# Patient Record
Sex: FEMALE | Employment: PART TIME | URBAN - METROPOLITAN AREA
[De-identification: names, ages, dates, MRNs, and addresses within clinical notes are randomized per-mention and may not be internally consistent; named-entity substitution may affect disease eponyms.]

---

## 2017-04-20 ENCOUNTER — TELEPHONE (OUTPATIENT)
Dept: FAMILY MEDICINE | Facility: CLINIC | Age: 32
End: 2017-04-20

## 2017-04-20 NOTE — TELEPHONE ENCOUNTER
Panel Management Review      Patient has the following on her problem list: None      Composite cancer screening  Chart review shows that this patient is due/due soon for the following Pap Smear  Summary:    Patient is due/failing the following:   PAP    Action needed:   Patient needs office visit for Physical with pap.    Type of outreach:    Phone, spoke to patient.  she scheduled physical with pap for 04/25 ta 930 am    Questions for provider review:    None                                                                                                                                    Stephanie Broussard CMA       Chart closed .

## 2017-04-25 ENCOUNTER — OFFICE VISIT (OUTPATIENT)
Dept: FAMILY MEDICINE | Facility: CLINIC | Age: 32
End: 2017-04-25
Payer: COMMERCIAL

## 2017-04-25 VITALS
BODY MASS INDEX: 35.44 KG/M2 | RESPIRATION RATE: 20 BRPM | HEIGHT: 63 IN | WEIGHT: 200 LBS | TEMPERATURE: 98.6 F | SYSTOLIC BLOOD PRESSURE: 105 MMHG | DIASTOLIC BLOOD PRESSURE: 69 MMHG | HEART RATE: 60 BPM

## 2017-04-25 DIAGNOSIS — Z00.00 ROUTINE GENERAL MEDICAL EXAMINATION AT A HEALTH CARE FACILITY: Primary | ICD-10-CM

## 2017-04-25 DIAGNOSIS — Z83.3 FAMILY HISTORY OF DIABETES MELLITUS: ICD-10-CM

## 2017-04-25 DIAGNOSIS — T83.32XA IUD STRINGS LOST: ICD-10-CM

## 2017-04-25 LAB
BASOPHILS # BLD AUTO: 0 10E9/L (ref 0–0.2)
BASOPHILS NFR BLD AUTO: 0.2 %
DIFFERENTIAL METHOD BLD: NORMAL
EOSINOPHIL # BLD AUTO: 0.1 10E9/L (ref 0–0.7)
EOSINOPHIL NFR BLD AUTO: 2.3 %
ERYTHROCYTE [DISTWIDTH] IN BLOOD BY AUTOMATED COUNT: 13 % (ref 10–15)
HBA1C MFR BLD: 5.4 % (ref 4.3–6)
HCT VFR BLD AUTO: 38.7 % (ref 35–47)
HGB BLD-MCNC: 13.2 G/DL (ref 11.7–15.7)
LYMPHOCYTES # BLD AUTO: 2.6 10E9/L (ref 0.8–5.3)
LYMPHOCYTES NFR BLD AUTO: 45.6 %
MCH RBC QN AUTO: 29.1 PG (ref 26.5–33)
MCHC RBC AUTO-ENTMCNC: 34.1 G/DL (ref 31.5–36.5)
MCV RBC AUTO: 85 FL (ref 78–100)
MONOCYTES # BLD AUTO: 0.4 10E9/L (ref 0–1.3)
MONOCYTES NFR BLD AUTO: 6.7 %
NEUTROPHILS # BLD AUTO: 2.6 10E9/L (ref 1.6–8.3)
NEUTROPHILS NFR BLD AUTO: 45.2 %
PLATELET # BLD AUTO: 284 10E9/L (ref 150–450)
RBC # BLD AUTO: 4.54 10E12/L (ref 3.8–5.2)
WBC # BLD AUTO: 5.7 10E9/L (ref 4–11)

## 2017-04-25 PROCEDURE — 36415 COLL VENOUS BLD VENIPUNCTURE: CPT | Performed by: FAMILY MEDICINE

## 2017-04-25 PROCEDURE — G0145 SCR C/V CYTO,THINLAYER,RESCR: HCPCS | Performed by: FAMILY MEDICINE

## 2017-04-25 PROCEDURE — 87624 HPV HI-RISK TYP POOLED RSLT: CPT | Performed by: FAMILY MEDICINE

## 2017-04-25 PROCEDURE — 83036 HEMOGLOBIN GLYCOSYLATED A1C: CPT | Performed by: FAMILY MEDICINE

## 2017-04-25 PROCEDURE — 99395 PREV VISIT EST AGE 18-39: CPT | Performed by: FAMILY MEDICINE

## 2017-04-25 PROCEDURE — 85025 COMPLETE CBC W/AUTO DIFF WBC: CPT | Performed by: FAMILY MEDICINE

## 2017-04-25 NOTE — LETTER
May 1, 2017    Aubree Laura  [address] [facility] [address]    Dear Aubree,  We are happy to inform you that your PAP smear result from 04/25/17 is normal.  We are now able to do a follow up test on PAP smears. The DNA test is for HPV (Human Papilloma Virus). Cervical cancer is closely linked with certain types of HPV. Your result showed no evidence of high risk HPV.  Therefore we recommend you return in 3 years for your next pap smear.  You will still need to return to the clinic every year for an annual exam and other preventive tests.  Please contact the clinic at [phone] with any questions.  Sincerely,    Jenn Lay MD/[facility]

## 2017-04-25 NOTE — PROGRESS NOTES
SUBJECTIVE:     CC: Aubree Laura is an 31 year old woman who presents for preventive health visit.     Healthy Habits:    Do you get at least three servings of calcium containing foods daily (dairy, green leafy vegetables, etc.)? yes    Amount of exercise or daily activities, outside of work: 0 day(s) per week    Problems taking medications regularly not applicable    Medication side effects: No    Have you had an eye exam in the past two years? yes    Do you see a dentist twice per year? yes    Do you have sleep apnea, excessive snoring or daytime drowsiness?no    Other:    IUD placed 3/7/14 - no period with IUD  Also reports some weight gain. Does not really drink much soda or juice. History of gestational diabetes, both parents also have diabetes.         Today's PHQ-2 Score:   PHQ-2 ( 1999 Pf[employer]) 12/27/2016 5/2/2016   Q1: Little interest or pleasure in doing things 0 0   Q2: Feeling down, depressed or hopeless 0 0   PHQ-2 Score 0 0       Abuse: Current or Past(Physical, Sexual or Emotional)- No  Do you feel safe in your environment - Yes    Social History   Substance Use Topics     Smoking status: Never Smoker     Smokeless tobacco: Never Used     Alcohol use No     The patient does not drink >3 drinks per day nor >7 drinks per week.    Recent Labs   Lab Test  07/02/15   0859  04/06/10   0916   CHOL  110  121   HDL  34*  37*   LDL  65  75   TRIG  54  48   CHOLHDLRATIO  3.2  3.3       Reviewed orders with patient.  Reviewed health maintenance and updated orders accordingly - Yes    Mammo Decision Support:  Mammogram not appropriate for this patient based on age.    Pertinent mammograms are reviewed under the imaging tab.  History of abnormal Pap smear: NO - age 30- 65 PAP every 3 years recommended    Reviewed and updated as needed this visit by clinical staff  Tobacco  Allergies         Reviewed and updated as needed this visit by Provider            ROS:  C: NEGATIVE for fever, chills, change in  "weight  I: NEGATIVE for worrisome rashes, moles or lesions  E: NEGATIVE for vision changes or irritation  ENT: NEGATIVE for ear, mouth and throat problems  R: NEGATIVE for significant cough or SOB  B: NEGATIVE for masses, tenderness or discharge  CV: NEGATIVE for chest pain, palpitations or peripheral edema  GI: NEGATIVE for nausea, abdominal pain, heartburn, or change in bowel habits  : NEGATIVE for unusual urinary or vaginal symptoms. Periods are regular.  M: NEGATIVE for significant arthralgias or myalgia  N: NEGATIVE for weakness, dizziness or paresthesias  P: NEGATIVE for changes in mood or affect    Problem list, Medication list, Allergies, and Medical/Social/Surgical histories reviewed in EPIC and updated as appropriate.  OBJECTIVE:     /69 (BP Location: Right arm, Patient Position: Chair, Cuff Size: Adult Large)  Pulse 60  Temp 98.6  F (37  C) (Oral)  Resp 20  Ht 5' 3\" (1.6 m)  Wt 200 lb (90.7 kg)  Breastfeeding? No  BMI 35.43 kg/m2  EXAM:  GENERAL: healthy, alert and no distress  EYES: Eyes grossly normal to inspection, PERRL and conjunctivae and sclerae normal  HENT: ear canals and TM's normal, nose and mouth without ulcers or lesions  NECK: no adenopathy, no asymmetry, masses, or scars and thyroid normal to palpation  RESP: lungs clear to auscultation - no rales, rhonchi or wheezes  CV: regular rate and rhythm, normal S1 S2, no S3 or S4, no murmur, click or rub, no peripheral edema and peripheral pulses strong  ABDOMEN: soft, nontender, no hepatosplenomegaly, no masses and bowel sounds normal   (female): normal female external genitalia, normal urethral meatus, vaginal mucosa pink, moist, well rugated, and normal cervix/adnexa/uterus without masses or discharge. IUD strings not visible   MS: no gross musculoskeletal defects noted, no edema  SKIN: no suspicious lesions or rashes  NEURO: Normal strength and tone, mentation intact and speech normal  PSYCH: mentation appears normal, affect " "normal/bright    ASSESSMENT/PLAN:     1. Routine general medical examination at a health care facility  - pap done today. If negative will repeat in 3 years.   - CBC with platelets differential  - Pap imaged thin layer screen with HPV - recommended age 30 - 65 years (select HPV order below)    2. Family history of diabetes mellitus  - diet and exercise reviewed   - Hemoglobin A1c    3. IUD strings lost  - patient to follow up with gyn for recheck      COUNSELING:   Reviewed preventive health counseling, as reflected in patient instructions       Regular exercise       Healthy diet/nutrition         reports that she has never smoked. She has never used smokeless tobacco.    Estimated body mass index is 35.43 kg/(m^2) as calculated from the following:    Height as of this encounter: 5' 3\" (1.6 m).    Weight as of this encounter: 200 lb (90.7 kg).   Weight management plan: Discussed healthy diet and exercise guidelines and patient will follow up in 12 months in clinic to re-evaluate.    Counseling Resources:  ATP IV Guidelines  Pooled Cohorts Equation Calculator  Breast Cancer Risk Calculator  FRAX Risk Assessment  ICSI Preventive Guidelines  Dietary Guidelines for Americans, 2010  USDA's MyPlate  ASA Prophylaxis  Lung CA Screening    Jenn Lay MD  [facility]  "

## 2017-04-25 NOTE — NURSING NOTE
"Chief Complaint   Patient presents with     Physical     PE/PAP---pt is fasting this AM       Initial /69 (BP Location: Right arm, Patient Position: Chair, Cuff Size: Adult Large)  Pulse 60  Temp 98.6  F (37  C) (Oral)  Resp 20  Ht 5' 3\" (1.6 m)  Wt 200 lb (90.7 kg)  Breastfeeding? No  BMI 35.43 kg/m2 Estimated body mass index is 35.43 kg/(m^2) as calculated from the following:    Height as of this encounter: 5' 3\" (1.6 m).    Weight as of this encounter: 200 lb (90.7 kg).  Medication Reconciliation: complete     Maria Eugenia Miller/[facility]      "

## 2017-04-25 NOTE — LETTER
[facility]  [address] [place], [address]  [phone]      April 26, 2017    Aubree Laura  [address]  [facility] [address]          Dear Aubree,          Your labs from recent physical are normal. Please follow diet plan we talked about which includes cutting back on processed foods, reducing intake of rice and tortillas.  Increase intake of fruits and vegetables and exercise at least 30 minutes 5 times a week.         Follow up as needed    Best Wishes    Dr. Jenn Chance  [facility]

## 2017-04-25 NOTE — MR AVS SNAPSHOT
After Visit Summary   4/25/2017    Aubree Laura    MRN: [id number]           Patient Information     Date Of Birth          1985        Visit Information        Provider Department      4/25/2017 9:15 AM Jenn Lay MD; [employer] [facility]        Today's Diagnoses     Routine general medical examination at a health care facility    -  1    Family history of diabetes mellitus          Care Instructions      Preventive Health Recommendations  Female Ages 26 - 39  Yearly exam:   See your health care provider every year in order to    Review health changes.     Discuss preventive care.      Review your medicines if you your doctor has prescribed any.    Until age 30: Get a Pap test every three years (more often if you have had an abnormal result).    After age 30: Talk to your doctor about whether you should have a Pap test every 3 years or have a Pap test with HPV screening every 5 years.   You do not need a Pap test if your uterus was removed (hysterectomy) and you have not had cancer.  You should be tested each year for STDs (sexually transmitted diseases), if you're at risk.   Talk to your provider about how often to have your cholesterol checked.  If you are at risk for diabetes, you should have a diabetes test (fasting glucose).  Shots: Get a flu shot each year. Get a tetanus shot every 10 years.   Nutrition:     Eat at least 5 servings of fruits and vegetables each day.    Eat whole-grain bread, whole-wheat pasta and brown rice instead of white grains and rice.    Talk to your provider about Calcium and Vitamin D.     Lifestyle    Exercise at least 150 minutes a week (30 minutes a day, 5 days of the week). This will help you control your weight and prevent disease.    Limit alcohol to one drink per day.    No smoking.     Wear sunscreen to prevent skin cancer.    See your dentist every six months for an exam and cleaning.        "   Follow-ups after your visit        Who to contact     If you have questions or need follow up information about today's clinic visit or your schedule please contact [facility] directly at [phone].  Normal or non-critical lab and imaging results will be communicated to you by MyChart, letter or phone within 4 business days after the clinic has received the results. If you do not hear from us within 7 days, please contact the clinic through MyChart or phone. If you have a critical or abnormal lab result, we will notify you by phone as soon as possible.  Submit refill requests through [employer] or call your pharmacy and they will forward the refill request to us. Please allow 3 business days for your refill to be completed.          Additional Information About Your Visit        [employer] Information     [employer] lets you send messages to your doctor, view your test results, renew your prescriptions, schedule appointments and more. To sign up, go to [email or web address] . Click on \"Log in\" on the left side of the screen, which will take you to the Welcome page. Then click on \"Sign up Now\" on the right side of the page.     You will be asked to enter the access code listed below, as well as some personal information. Please follow the directions to create your username and password.     Your access code is: [email or web address]  Expires: 2017 10:13 AM     Your access code will  in 90 days. If you need help or a new code, please call your [facility] or [phone].        Care EveryWhere ID     This is your Care EveryWhere ID. This could be used by other organizations to access your [place] medical records  [id number]        Your Vitals Were     Pulse Temperature Respirations Height Breastfeeding? BMI (Body Mass Index)    60 98.6  F (37  C) (Oral) 20 5' 3\" (1.6 m) No 35.43 kg/m2       Blood Pressure from Last 3 Encounters:   17 105/69   16 110/70   16 114/66    " Weight from Last 3 Encounters:   04/25/17 200 lb (90.7 kg)   12/27/16 197 lb 6.4 oz (89.5 kg)   05/02/16 192 lb (87.1 kg)              We Performed the Following     CBC with platelets differential     Hemoglobin A1c        Primary Care Provider Office Phone # Fax #    Kimberly Ayala [phone]       [facility] [address] [place] [address]        Thank you!     Thank you for choosing [facility]  for your care. Our goal is always to provide you with excellent care. Hearing back from our patients is one way we can continue to improve our services. Please take a few minutes to complete the written survey that you may receive in the mail after your visit with us. Thank you!             Your Updated Medication List - Protect others around you: Learn how to safely use, store and throw away your medicines at www.disposemymeds.org.          This list is accurate as of: 4/25/17 10:13 AM.  Always use your most recent med list.                   Brand Name Dispense Instructions for use    levonorgestrel 20 MCG/24HR IUD    MIRENA    1 each    1 each (20 mcg) by Intrauterine route once for 1 dose

## 2017-04-27 ENCOUNTER — TELEPHONE (OUTPATIENT)
Dept: FAMILY MEDICINE | Facility: CLINIC | Age: 32
End: 2017-04-27

## 2017-04-27 LAB
COPATH REPORT: NORMAL
PAP: NORMAL

## 2017-04-27 NOTE — TELEPHONE ENCOUNTER
Per Dr. Lay- call patient and have her see her Gyn to check her IUD as strings were not visible on exam yesterday and also let her know labs normal and letter mailed but letter mailed in English.  Called using   Services.  L/M to call.  Ro Ayala RN

## 2017-04-27 NOTE — TELEPHONE ENCOUNTER
Patient called and I advised of below.  Patient will call to schedule appt.  Advised of lab results as well.  Ro Ayala RN

## 2017-05-01 LAB
FINAL DIAGNOSIS: NORMAL
HPV HR 12 DNA CVX QL NAA+PROBE: NEGATIVE
HPV16 DNA SPEC QL NAA+PROBE: NEGATIVE
HPV18 DNA SPEC QL NAA+PROBE: NEGATIVE
SPECIMEN DESCRIPTION: NORMAL

## 2017-05-18 ENCOUNTER — OFFICE VISIT (OUTPATIENT)
Dept: INTERNAL MEDICINE | Facility: CLINIC | Age: 32
End: 2017-05-18
Payer: COMMERCIAL

## 2017-05-18 VITALS
BODY MASS INDEX: 35.91 KG/M2 | TEMPERATURE: 98.2 F | WEIGHT: 202.7 LBS | RESPIRATION RATE: 16 BRPM | HEART RATE: 95 BPM | HEIGHT: 63 IN | SYSTOLIC BLOOD PRESSURE: 104 MMHG | DIASTOLIC BLOOD PRESSURE: 66 MMHG | OXYGEN SATURATION: 97 %

## 2017-05-18 DIAGNOSIS — R39.15 URGENCY OF URINATION: Primary | ICD-10-CM

## 2017-05-18 DIAGNOSIS — R82.90 NONSPECIFIC FINDING ON EXAMINATION OF URINE: ICD-10-CM

## 2017-05-18 LAB
ALBUMIN UR-MCNC: >=300 MG/DL
APPEARANCE UR: ABNORMAL
BACTERIA #/AREA URNS HPF: ABNORMAL /HPF
BILIRUB UR QL STRIP: NEGATIVE
COLOR UR AUTO: YELLOW
GLUCOSE UR STRIP-MCNC: NEGATIVE MG/DL
HGB UR QL STRIP: ABNORMAL
KETONES UR STRIP-MCNC: NEGATIVE MG/DL
LEUKOCYTE ESTERASE UR QL STRIP: ABNORMAL
NITRATE UR QL: NEGATIVE
NON-SQ EPI CELLS #/AREA URNS LPF: ABNORMAL /LPF
PH UR STRIP: 6.5 PH (ref 5–7)
RBC #/AREA URNS AUTO: ABNORMAL /HPF (ref 0–2)
SP GR UR STRIP: 1.02 (ref 1–1.03)
URN SPEC COLLECT METH UR: ABNORMAL
UROBILINOGEN UR STRIP-ACNC: 1 EU/DL (ref 0.2–1)
WBC #/AREA URNS AUTO: ABNORMAL /HPF (ref 0–2)

## 2017-05-18 PROCEDURE — 87086 URINE CULTURE/COLONY COUNT: CPT | Performed by: NURSE PRACTITIONER

## 2017-05-18 PROCEDURE — 87186 SC STD MICRODIL/AGAR DIL: CPT | Performed by: NURSE PRACTITIONER

## 2017-05-18 PROCEDURE — 87088 URINE BACTERIA CULTURE: CPT | Performed by: NURSE PRACTITIONER

## 2017-05-18 PROCEDURE — 81001 URINALYSIS AUTO W/SCOPE: CPT | Performed by: NURSE PRACTITIONER

## 2017-05-18 PROCEDURE — 99213 OFFICE O/P EST LOW 20 MIN: CPT | Performed by: NURSE PRACTITIONER

## 2017-05-18 RX ORDER — CIPROFLOXACIN 250 MG/1
250 TABLET, FILM COATED ORAL 2 TIMES DAILY
Qty: 6 TABLET | Refills: 0 | Status: SHIPPED | OUTPATIENT
Start: 2017-05-18 | End: 2017-06-09

## 2017-05-18 NOTE — MR AVS SNAPSHOT
"              After Visit Summary   5/18/2017    Aubree Laura    MRN: [id number]           Patient Information     Date Of Birth          1985        Visit Information        Provider Department      5/18/2017 3:30 PM Reena Romero NP; [employer] [facility]        Today's Diagnoses     Urgency of urination    -  1    Nonspecific finding on examination of urine           Follow-ups after your visit        Your next 10 appointments already scheduled     Jun 09, 2017  2:15 PM CDT   SHORT with Cachorro Posey MD   [facility] ([facility])    [address]  [facility] [id number] [phone]              Who to contact     If you have questions or need follow up information about today's clinic visit or your schedule please contact [facility] directly at [phone].  Normal or non-critical lab and imaging results will be communicated to you by MyChart, letter or phone within 4 business days after the clinic has received the results. If you do not hear from us within 7 days, please contact the clinic through MyChart or phone. If you have a critical or abnormal lab result, we will notify you by phone as soon as possible.  Submit refill requests through [employer] or call your pharmacy and they will forward the refill request to us. Please allow 3 business days for your refill to be completed.          Additional Information About Your Visit        MyChart Information     [employer] lets you send messages to your doctor, view your test results, renew your prescriptions, schedule appointments and more. To sign up, go to [email or web address] . Click on \"Log in\" on the left side of the screen, which will take you to the Welcome page. Then click on \"Sign up Now\" on the right side of the page.     You will be asked to enter the access code listed below, as well as some personal information. Please follow " "the directions to create your username and password.     Your access code is: [email or web address]  Expires: 2017 10:13 AM     Your access code will  in 90 days. If you need help or a new code, please call your [place] clinic or [phone].        Care EveryWhere ID     This is your Care EveryWhere ID. This could be used by other organizations to access your [place] medical records  [id number]        Your Vitals Were     Pulse Temperature Respirations Height Pulse Oximetry BMI (Body Mass Index)    95 98.2  F (36.8  C) (Oral) 16 5' 3\" (1.6 m) 97% 35.91 kg/m2       Blood Pressure from Last 3 Encounters:   17 104/66   17 105/69   16 110/70    Weight from Last 3 Encounters:   17 202 lb 11.2 oz (91.9 kg)   17 200 lb (90.7 kg)   16 197 lb 6.4 oz (89.5 kg)              We Performed the Following     UA reflex to Microscopic and Culture     Urine Culture Aerobic Bacterial     Urine Microscopic          Today's Medication Changes          These changes are accurate as of: 17  4:33 PM.  If you have any questions, ask your nurse or doctor.               Start taking these medicines.        Dose/Directions    ciprofloxacin 250 MG tablet   Commonly known as:  CIPRO   Used for:  Urgency of urination   Started by:  Reena Romero, FATEMEH        Dose:  250 mg   Take 1 tablet (250 mg) by mouth 2 times daily   Quantity:  6 tablet   Refills:  0            Where to get your medicines      These medications were sent to [id number] - [place], [place] - [address]  [address] [facility] [address]    Hours:  9Am-9Pm (M-F) 9Am-6Pm (S&S) Phone:  [phone]     ciprofloxacin 250 MG tablet                Primary Care Provider Office Phone # Fax #    Kimberly Ayala [phone]       [facility] [address] [facility] [address]        Thank you!     Thank you for choosing [facility]  for your care. Our goal is always to " provide you with excellent care. Hearing back from our patients is one way we can continue to improve our services. Please take a few minutes to complete the written survey that you may receive in the mail after your visit with us. Thank you!             Your Updated Medication List - Protect others around you: Learn how to safely use, store and throw away your medicines at www.disposemymeds.org.          This list is accurate as of: 5/18/17  4:33 PM.  Always use your most recent med list.                   Brand Name Dispense Instructions for use    ciprofloxacin 250 MG tablet    CIPRO    6 tablet    Take 1 tablet (250 mg) by mouth 2 times daily       levonorgestrel 20 MCG/24HR IUD    MIRENA    1 each    1 each (20 mcg) by Intrauterine route once for 1 dose

## 2017-05-18 NOTE — PROGRESS NOTES
SUBJECTIVE:                                                    Aubree Laura is a 31 year old female who presents to clinic today for the following health issues:      URINARY TRACT SYMPTOMS      Duration: 2 days    Description  dysuria and hematuria    Intensity:  mild, moderate    Accompanying signs and symptoms:  Fever/chills: no   Flank pain no   Nausea and vomiting: no   Vaginal symptoms: none  Abdominal/Pelvic Pain: YES    History  History of frequent UTI's: YES- last one 1 year ago  History of kidney stones: no   Sexually Active: YES- IUD  Possibility of pregnancy: No    Precipitating or alleviating factors: None    Therapies tried and outcome: limiting fluids to avoid frequency             Problem list and histories reviewed & adjusted, as indicated.  Additional history: as documented    Patient Active Problem List   Diagnosis     CARDIOVASCULAR SCREENING; LDL GOAL LESS THAN 160     Past Surgical History:   Procedure Laterality Date     C  DELIVERY ONLY       C IUD,MIRENA  3/7/14      SECTION  2012    Procedure:  SECTION;   Repeat  SECTION ;  Surgeon: Kimberly Ayala MD;  Location: [facility]       Social History   Substance Use Topics     Smoking status: Never Smoker     Smokeless tobacco: Never Used     Alcohol use No     Family History   Problem Relation Age of Onset     DIABETES Mother      DIABETES Father      Hyperlipidemia Father      DIABETES Maternal Grandmother          Current Outpatient Prescriptions   Medication Sig Dispense Refill     levonorgestrel (MIRENA) 20 MCG/24HR IUD 1 each (20 mcg) by Intrauterine route once for 1 dose 1 each 0     BP Readings from Last 3 Encounters:   17 104/66   17 105/69   16 110/70    Wt Readings from Last 3 Encounters:   17 202 lb 11.2 oz (91.9 kg)   17 200 lb (90.7 kg)   16 197 lb 6.4 oz (89.5 kg)                    Reviewed and updated as needed this visit by clinical staff  Tobacco   "Allergies  Meds  Med Hx  Surg Hx  Fam Hx  Soc Hx      Reviewed and updated as needed this visit by Provider         ROS:  C: NEGATIVE for fever, chills, change in weight  CV: NEGATIVE for chest pain, palpitations or peripheral edema    OBJECTIVE:                                                    /66 (BP Location: Right arm, Patient Position: Chair, Cuff Size: Adult Regular)  Pulse 95  Temp 98.2  F (36.8  C) (Oral)  Resp 16  Ht 5' 3\" (1.6 m)  Wt 202 lb 11.2 oz (91.9 kg)  SpO2 97%  BMI 35.91 kg/m2  Body mass index is 35.91 kg/(m^2).  GENERAL: healthy, alert and no distress  ABDOMEN: soft, nontender, no hepatosplenomegaly, no masses and bowel sounds normal  BACK: no CVA tenderness, no paralumbar tenderness         ASSESSMENT/PLAN:                                                              ICD-10-CM    1. Urgency of urination R39.15 UA reflex to Microscopic and Culture     Urine Microscopic     ciprofloxacin (CIPRO) 250 MG tablet   2. Nonspecific finding on examination of urine R82.90 Urine Culture Aerobic Bacterial       Push fluids  Regular meals or snacks.        Reena Romero NP  [facility]    "

## 2017-05-18 NOTE — NURSING NOTE
"Chief Complaint   Patient presents with     UTI     burning,urgency,and abdominal pain for 1 day       Initial /66 (BP Location: Right arm, Patient Position: Chair, Cuff Size: Adult Regular)  Pulse 95  Temp 98.2  F (36.8  C) (Oral)  Resp 16  Ht 5' 3\" (1.6 m)  Wt 202 lb 11.2 oz (91.9 kg)  SpO2 97%  BMI 35.91 kg/m2 Estimated body mass index is 35.91 kg/(m^2) as calculated from the following:    Height as of this encounter: 5' 3\" (1.6 m).    Weight as of this encounter: 202 lb 11.2 oz (91.9 kg).  Medication Reconciliation: complete    "

## 2017-05-20 LAB
BACTERIA SPEC CULT: ABNORMAL
MICRO REPORT STATUS: ABNORMAL
MICROORGANISM SPEC CULT: ABNORMAL
SPECIMEN SOURCE: ABNORMAL

## 2017-06-09 ENCOUNTER — OFFICE VISIT (OUTPATIENT)
Dept: OBGYN | Facility: CLINIC | Age: 32
End: 2017-06-09
Payer: COMMERCIAL

## 2017-06-09 VITALS
DIASTOLIC BLOOD PRESSURE: 70 MMHG | BODY MASS INDEX: 35.95 KG/M2 | HEART RATE: 60 BPM | HEIGHT: 63 IN | SYSTOLIC BLOOD PRESSURE: 106 MMHG | WEIGHT: 202.9 LBS

## 2017-06-09 DIAGNOSIS — R30.0 DYSURIA: Primary | ICD-10-CM

## 2017-06-09 DIAGNOSIS — N89.8 VAGINAL DISCHARGE: ICD-10-CM

## 2017-06-09 LAB
ALBUMIN UR-MCNC: NEGATIVE MG/DL
APPEARANCE UR: CLEAR
BACTERIA #/AREA URNS HPF: ABNORMAL /HPF
BILIRUB UR QL STRIP: NEGATIVE
COLOR UR AUTO: YELLOW
GLUCOSE UR STRIP-MCNC: NEGATIVE MG/DL
HGB UR QL STRIP: ABNORMAL
KETONES UR STRIP-MCNC: NEGATIVE MG/DL
LEUKOCYTE ESTERASE UR QL STRIP: NEGATIVE
MICRO REPORT STATUS: NORMAL
NITRATE UR QL: NEGATIVE
NON-SQ EPI CELLS #/AREA URNS LPF: ABNORMAL /LPF
PH UR STRIP: 5.5 PH (ref 5–7)
RBC #/AREA URNS AUTO: ABNORMAL /HPF (ref 0–2)
SP GR UR STRIP: 1.02 (ref 1–1.03)
SPECIMEN SOURCE: NORMAL
URN SPEC COLLECT METH UR: ABNORMAL
UROBILINOGEN UR STRIP-ACNC: 0.2 EU/DL (ref 0.2–1)
WBC #/AREA URNS AUTO: ABNORMAL /HPF (ref 0–2)
WET PREP SPEC: NORMAL

## 2017-06-09 PROCEDURE — 81001 URINALYSIS AUTO W/SCOPE: CPT | Performed by: OBSTETRICS & GYNECOLOGY

## 2017-06-09 PROCEDURE — 99203 OFFICE O/P NEW LOW 30 MIN: CPT | Performed by: OBSTETRICS & GYNECOLOGY

## 2017-06-09 PROCEDURE — 87086 URINE CULTURE/COLONY COUNT: CPT | Performed by: OBSTETRICS & GYNECOLOGY

## 2017-06-09 PROCEDURE — 87210 SMEAR WET MOUNT SALINE/INK: CPT | Performed by: OBSTETRICS & GYNECOLOGY

## 2017-06-09 RX ORDER — SULFAMETHOXAZOLE/TRIMETHOPRIM 800-160 MG
1 TABLET ORAL 2 TIMES DAILY
Qty: 14 TABLET | Refills: 0 | Status: SHIPPED | OUTPATIENT
Start: 2017-06-09 | End: 2017-11-02

## 2017-06-09 NOTE — NURSING NOTE
"Chief Complaint   Patient presents with     IUD     Check for IUD position/ strings - unable to see strings at pap visit referred to OB.  c/o continued vaginal itching / burning  and odor in urine.     initial /70  Pulse 60  Ht 5' 2.5\" (1.588 m)  Wt 202 lb 14.4 oz (92 kg)  Breastfeeding? No  BMI 36.52 kg/m2 Estimated body mass index is 36.52 kg/(m^2) as calculated from the following:    Height as of this encounter: 5' 2.5\" (1.588 m).    Weight as of this encounter: 202 lb 14.4 oz (92 kg).  BP completed using cuff size regular.  Chani Perez CMA    "

## 2017-06-09 NOTE — MR AVS SNAPSHOT
"              After Visit Summary   2017    Aubree Laura    MRN: [id number]           Patient Information     Date Of Birth          1985        Visit Information        Provider Department      2017 2:15 PM Cachorro Posey MD; [employer] [facility]        Today's Diagnoses     Dysuria    -  1    Vaginal discharge           Follow-ups after your visit        Who to contact     If you have questions or need follow up information about today's clinic visit or your schedule please contact [facility] directly at [phone].  Normal or non-critical lab and imaging results will be communicated to you by HSystemhart, letter or phone within 4 business days after the clinic has received the results. If you do not hear from us within 7 days, please contact the clinic through HSystemhart or phone. If you have a critical or abnormal lab result, we will notify you by phone as soon as possible.  Submit refill requests through Lumenz or call your pharmacy and they will forward the refill request to us. Please allow 3 business days for your refill to be completed.          Additional Information About Your Visit        MyChart Information     Lumenz lets you send messages to your doctor, view your test results, renew your prescriptions, schedule appointments and more. To sign up, go to [email or web address] . Click on \"Log in\" on the left side of the screen, which will take you to the Welcome page. Then click on \"Sign up Now\" on the right side of the page.     You will be asked to enter the access code listed below, as well as some personal information. Please follow the directions to create your username and password.     Your access code is: [email or web address]  Expires: 2017 10:13 AM     Your access code will  in 90 days. If you need help or a new code, please call your [facility] or [phone].        Care EveryWhere ID     This is your Care " "EveryWhere ID. This could be used by other organizations to access your [place] medical records  [id number]        Your Vitals Were     Pulse Height Breastfeeding? BMI (Body Mass Index)          60 5' 2.5\" (1.588 m) No 36.52 kg/m2         Blood Pressure from Last 3 Encounters:   06/09/17 106/70   05/18/17 104/66   04/25/17 105/69    Weight from Last 3 Encounters:   06/09/17 202 lb 14.4 oz (92 kg)   05/18/17 202 lb 11.2 oz (91.9 kg)   04/25/17 200 lb (90.7 kg)              We Performed the Following     UA reflex to Microscopic and Culture     Urine Microscopic     Wet prep          Today's Medication Changes          These changes are accurate as of: 6/9/17  3:44 PM.  If you have any questions, ask your nurse or doctor.               Start taking these medicines.        Dose/Directions    sulfamethoxazole-trimethoprim 800-160 MG per tablet   Commonly known as:  BACTRIM DS/SEPTRA DS   Used for:  Dysuria   Started by:  Cachorro Posey MD        Dose:  1 tablet   Take 1 tablet by mouth 2 times daily   Quantity:  14 tablet   Refills:  0            Where to get your medicines      These medications were sent to [id number] - [facility] [address]  [address] [place] [id number]    Hours:  9Am-9Pm (M-F) 9Am-6Pm (S&S) Phone:  [phone]     sulfamethoxazole-trimethoprim 800-160 MG per tablet                Primary Care Provider Office Phone # Fax #    Kimberly Ayala [phone]       [facility] [address] [facility] [address]        Thank you!     Thank you for choosing [facility]  for your care. Our goal is always to provide you with excellent care. Hearing back from our patients is one way we can continue to improve our services. Please take a few minutes to complete the written survey that you may receive in the mail after your visit with us. Thank you!             Your Updated Medication List - Protect others around you: Learn how " to safely use, store and throw away your medicines at www.disposemymeds.org.          This list is accurate as of: 6/9/17  3:44 PM.  Always use your most recent med list.                   Brand Name Dispense Instructions for use    levonorgestrel 20 MCG/24HR IUD    MIRENA    1 each    1 each (20 mcg) by Intrauterine route once for 1 dose       sulfamethoxazole-trimethoprim 800-160 MG per tablet    BACTRIM DS/SEPTRA DS    14 tablet    Take 1 tablet by mouth 2 times daily

## 2017-06-09 NOTE — PROGRESS NOTES
"  SUBJECTIVE:  Aubree Laura is an 31 year old  P2 woman who presents for vaginal discharge/dysuria and lost IUD string      -discharge present for one week         -associated itching/no odor      -urinary frequency and pain      Past Medical History:   Diagnosis Date     Anemia     takes iron, not for over a month per pt.     Gestational diabetes     with previous pregnancy     Past Surgical History:   Procedure Laterality Date     C  DELIVERY ONLY       C IUD,MIRENA  3/7/14      SECTION  2012    Procedure:  SECTION;   Repeat  SECTION ;  Surgeon: Kimberly Ayala MD;  Location:  L+D     Current Outpatient Prescriptions   Medication     levonorgestrel (MIRENA) 20 MCG/24HR IUD     No current facility-administered medications for this visit.      No Known Allergies  Social History   Substance Use Topics     Smoking status: Never Smoker     Smokeless tobacco: Never Used     Alcohol use No       Review of Systems  CONSTITUTIONAL:NEGATIVE  EYES: NEGATIVE  ENT/MOUTH: NEGATIVE  RESP: NEGATIVE  CV: NEGATIVE  GI: NEGATIVE  : see above    OBJECTIVE:  /70  Pulse 60  Ht 5' 2.5\" (1.588 m)  Wt 202 lb 14.4 oz (92 kg)  Breastfeeding? No  BMI 36.52 kg/m2   EXAM:  GENERAL APPEARANCE: healthy, alert and no distress  ABDOMEN: soft, nontender, without hepatosplenomegaly or masses    Pelvic Exam:  Vulva: No external lesions, normal hair distribution, no adenopathy  Vagina: Moist, pink, discharge present  Cervix: IUD string per os    ASSESSMENT:  Vaginal discharge-wet prep negative  Dysuria-possible UTI, Rx for Septra  IUD in uterus    PLAN:  (N89.8) Vaginal irritation  (primary encounter diagnosis)  Plan: UA reflex to Microscopic and Culture, Wet prep              Health Maintenance   Topic Date Due     INFLUENZA VACCINE (SYSTEM ASSIGNED)  2017     PAP Q3 YR  2020     TETANUS IMMUNIZATION (SYSTEM ASSIGNED)  2022         "

## 2017-06-10 LAB
BACTERIA SPEC CULT: NORMAL
MICRO REPORT STATUS: NORMAL
SPECIMEN SOURCE: NORMAL

## 2017-11-02 ENCOUNTER — OFFICE VISIT (OUTPATIENT)
Dept: FAMILY MEDICINE | Facility: CLINIC | Age: 32
End: 2017-11-02
Payer: COMMERCIAL

## 2017-11-02 ENCOUNTER — RADIANT APPOINTMENT (OUTPATIENT)
Dept: GENERAL RADIOLOGY | Facility: CLINIC | Age: 32
End: 2017-11-02
Attending: FAMILY MEDICINE
Payer: COMMERCIAL

## 2017-11-02 VITALS
BODY MASS INDEX: 36.9 KG/M2 | TEMPERATURE: 98.1 F | HEART RATE: 64 BPM | SYSTOLIC BLOOD PRESSURE: 94 MMHG | DIASTOLIC BLOOD PRESSURE: 68 MMHG | OXYGEN SATURATION: 94 % | WEIGHT: 205 LBS | RESPIRATION RATE: 12 BRPM

## 2017-11-02 DIAGNOSIS — T83.32XA INTRAUTERINE CONTRACEPTIVE DEVICE THREADS LOST, INITIAL ENCOUNTER: ICD-10-CM

## 2017-11-02 DIAGNOSIS — T83.32XA INTRAUTERINE CONTRACEPTIVE DEVICE THREADS LOST, INITIAL ENCOUNTER: Primary | ICD-10-CM

## 2017-11-02 DIAGNOSIS — N89.8 VAGINAL DISCHARGE: ICD-10-CM

## 2017-11-02 LAB
SPECIMEN SOURCE: NORMAL
WET PREP SPEC: NORMAL

## 2017-11-02 PROCEDURE — 99214 OFFICE O/P EST MOD 30 MIN: CPT | Performed by: FAMILY MEDICINE

## 2017-11-02 PROCEDURE — 74010 XR KUB: CPT | Mod: 52

## 2017-11-02 PROCEDURE — 87591 N.GONORRHOEAE DNA AMP PROB: CPT | Performed by: FAMILY MEDICINE

## 2017-11-02 PROCEDURE — 87491 CHLMYD TRACH DNA AMP PROBE: CPT | Performed by: FAMILY MEDICINE

## 2017-11-02 PROCEDURE — 87210 SMEAR WET MOUNT SALINE/INK: CPT | Performed by: FAMILY MEDICINE

## 2017-11-02 NOTE — NURSING NOTE
"Chief Complaint   Patient presents with     Vaginal Problem       Initial There were no vitals taken for this visit. Estimated body mass index is 36.52 kg/(m^2) as calculated from the following:    Height as of 6/9/17: 5' 2.5\" (1.588 m).    Weight as of 6/9/17: 202 lb 14.4 oz (92 kg).  Medication Reconciliation: complete   Zahraa Steiner CMA      "

## 2017-11-02 NOTE — PROGRESS NOTES
SUBJECTIVE:   Aubree Laura is a 32 year old female who presents to clinic today for the following health issues:      Vaginal Symptoms      Duration: 2 mos    Description  burning, pain with intercourse and dark brown  Blood, some odor    Intensity:  moderate    Accompanying signs and symptoms (fever/dysuria/abdominal or back pain): some back pain but may be related to work    History  Sexually active: yes, single partner, contraception - IUD   Possibility of pregnancy: No  Recent antibiotic use: no     Precipitating or alleviating factors: Abx in .  Patient feels symptoms started after she finished the abx    Therapies tried and outcome: none           Problem list and histories reviewed & adjusted, as indicated.  Additional history: as documented    Patient Active Problem List   Diagnosis     CARDIOVASCULAR SCREENING; LDL GOAL LESS THAN 160     Past Surgical History:   Procedure Laterality Date     C  DELIVERY ONLY       C IUD,MIRENA  3/7/14      SECTION  2012    Procedure:  SECTION;   Repeat  SECTION ;  Surgeon: Kimberly Ayala MD;  Location: [facility]       Social History   Substance Use Topics     Smoking status: Never Smoker     Smokeless tobacco: Never Used     Alcohol use No     Family History   Problem Relation Age of Onset     DIABETES Mother      DIABETES Father      Hyperlipidemia Father      DIABETES Maternal Grandmother              Reviewed and updated as needed this visit by clinical staff     Reviewed and updated as needed this visit by Provider         ROS:  Constitutional, HEENT, cardiovascular, pulmonary, gi and gu systems are negative, except as otherwise noted.      OBJECTIVE:   BP 94/68 (BP Location: Right arm, Patient Position: Chair, Cuff Size: Adult Regular)  Pulse 64  Temp 98.1  F (36.7  C) (Oral)  Resp 12  Wt 205 lb (93 kg)  SpO2 94%  BMI 36.9 kg/m2  Body mass index is 36.9 kg/(m^2).  GENERAL: healthy, alert and no distress    (female): normal female external genitalia, normal urethral meatus , vaginal mucosa pink, moist, well rugated, vaginal discharge - white, watery and odorless and normal cervix, adnexae, and uterus without masses.  IUD strings were not visualized    Diagnostic Test Results:  Results for orders placed or performed in visit on 11/02/17 (from the past 24 hour(s))   Wet prep   Result Value Ref Range    Specimen Description Vagina     Wet Prep No Trichomonas seen     Wet Prep No clue cells seen     Wet Prep No yeast seen      XR- KUB - IUD in place    ASSESSMENT/PLAN:     1. Vaginal discharge  - Wet prep negative  - Will await GC/CT testing.  No cervical motion tenderness, so will not presumptively treat  - Wet prep  - Neisseria gonorrhoeae PCR  - Chlamydia trachomatis PCR    2. Intrauterine contraceptive device threads lost, initial encounter  - IUD is present on XR, but likely malpositioned  - Patient states the Mirena is about 5 years old anyway  - Will refer to OB/Gyn for removal and replacement if she wishes to continue with this form of contraception  - XR KUB; Future  - OB/GYN REFERRAL    Follow up with OB/Gyn    Melissa Roberson,   [facility]

## 2017-11-02 NOTE — MR AVS SNAPSHOT
After Visit Summary   11/2/2017    Aubree Laura    MRN: [id number]           Patient Information     Date Of Birth          1985        Visit Information        Provider Department      11/2/2017 6:45 AM Melissa Roberson DO; [employer] [facility]        Today's Diagnoses     Intrauterine contraceptive device threads lost, initial encounter    -  1    Vaginal discharge           Follow-ups after your visit        Additional Services     OB/GYN REFERRAL       Your provider has referred you to:  FM[facility]: [facility] [phone]   [email or web address]      Please be aware that coverage of these services is subject to the terms and limitations of your health insurance plan.  Call member services at your health plan with any benefit or coverage questions.      Please bring the following with you to your appointment:    (1) Any X-Rays, CTs or MRIs which have been performed.  Contact the facility where they were done to arrange for  prior to your scheduled appointment.   (2) List of current medications   (3) This referral request   (4) Any documents/labs given to you for this referral                  Who to contact     If you have questions or need follow up information about today's clinic visit or your schedule please contact [facility] directly at [phone].  Normal or non-critical lab and imaging results will be communicated to you by MyChart, letter or phone within 4 business days after the clinic has received the results. If you do not hear from us within 7 days, please contact the clinic through MyChart or phone. If you have a critical or abnormal lab result, we will notify you by phone as soon as possible.  Submit refill requests through Health eVillages or call your pharmacy and they will forward the refill request to us. Please allow 3 business days for your refill to be  "completed.          Additional Information About Your Visit        [employer] Information     ShareYourCart lets you send messages to your doctor, view your test results, renew your prescriptions, schedule appointments and more. To sign up, go to [email or web address] . Click on \"Log in\" on the left side of the screen, which will take you to the Welcome page. Then click on \"Sign up Now\" on the right side of the page.     You will be asked to enter the access code listed below, as well as some personal information. Please follow the directions to create your username and password.     Your access code is: [email or web address]  Expires: 2018  7:49 AM     Your access code will  in 90 days. If you need help or a new code, please call your [place] clinic or [phone].        Care EveryWhere ID     This is your Care EveryWhere ID. This could be used by other organizations to access your [place] medical records  [id number]        Your Vitals Were     Pulse Temperature Respirations Pulse Oximetry BMI (Body Mass Index)       64 98.1  F (36.7  C) (Oral) 12 94% 36.9 kg/m2        Blood Pressure from Last 3 Encounters:   17 94/68   17 106/70   17 104/66    Weight from Last 3 Encounters:   17 205 lb (93 kg)   17 202 lb 14.4 oz (92 kg)   17 202 lb 11.2 oz (91.9 kg)              We Performed the Following     Chlamydia trachomatis PCR     Neisseria gonorrhoeae PCR     OB/GYN REFERRAL     Wet prep          Today's Medication Changes          These changes are accurate as of: 17  7:49 AM.  If you have any questions, ask your nurse or doctor.               Stop taking these medicines if you haven't already. Please contact your care team if you have questions.     sulfamethoxazole-trimethoprim 800-160 MG per tablet   Commonly known as:  BACTRIM DS/SEPTRA DS   Stopped by:  Melissa Roberson, DO                    Primary Care Provider Office Phone # Fax #    Kimberly Ayala [phone] " [phone]       [facility] [address] [facility] [address]  [facility] [address]        Equal Access to Services     SHIRLEY GOLDSMITH : Hadii aad ku hadsygarcia Keenaorlando, wacheryda luqartha, qaybta kavickida nilayjarret, waxjohn billie sheldonhaleigh pembertonchata annafransisco cynthia zafar. So [facility] [phone].    ATENCIÓN: Si habla español, tiene a bennett disposición servicios gratuitos de asistencia lingüística. Llame al [phone].    We comply with applicable federal civil rights laws and [place] laws. We do not discriminate on the basis of race, color, national origin, age, disability, sex, sexual orientation, or gender identity.            Thank you!     Thank you for choosing [facility]  for your care. Our goal is always to provide you with excellent care. Hearing back from our patients is one way we can continue to improve our services. Please take a few minutes to complete the written survey that you may receive in the mail after your visit with us. Thank you!             Your Updated Medication List - Protect others around you: Learn how to safely use, store and throw away your medicines at www.disposemymeds.org.          This list is accurate as of: 11/2/17  7:49 AM.  Always use your most recent med list.                   Brand Name Dispense Instructions for use Diagnosis    levonorgestrel 20 MCG/24HR IUD    MIRENA    1 each    1 each (20 mcg) by Intrauterine route once for 1 dose

## 2017-11-02 NOTE — LETTER
November 7, 2017      Aubree Laura  [address] [facility] [address]        Dear Ms.Perez Laura,    We are writing to inform you of your test results.    Your test results fall within the expected range(s) or remain unchanged from previous results.  Please continue with current treatment plan.    Resulted Orders   Wet prep   Result Value Ref Range    Specimen Description Vagina     Wet Prep No Trichomonas seen     Wet Prep No clue cells seen     Wet Prep No yeast seen    Neisseria gonorrhoeae PCR   Result Value Ref Range    Specimen Descrip Cervical     N Gonorrhea PCR Negative NEG^Negative      Comment:      Negative for N. gonorrhoeae rRNA by transcription mediated amplification.  A negative result by transcription mediated amplification does not preclude   the presence of N. gonorrhoeae infection because results are dependent on   proper and adequate collection, absence of inhibitors, and sufficient rRNA to   be detected.     Chlamydia trachomatis PCR   Result Value Ref Range    Specimen Description Cervical     Chlamydia Trachomatis PCR Negative NEG^Negative      Comment:      Negative for C. trachomatis rRNA by transcription mediated amplification.  A negative result by transcription mediated amplification does not preclude   the presence of C. trachomatis infection because results are dependent on   proper and adequate collection, absence of inhibitors, and sufficient rRNA to   be detected.         If you have any questions or concerns, please call the clinic at the number listed above.       Sincerely,        Melissa Roberson, DO

## 2017-11-03 LAB
C TRACH DNA SPEC QL NAA+PROBE: NEGATIVE
N GONORRHOEA DNA SPEC QL NAA+PROBE: NEGATIVE
SPECIMEN SOURCE: NORMAL
SPECIMEN SOURCE: NORMAL

## 2017-11-09 ENCOUNTER — OFFICE VISIT (OUTPATIENT)
Dept: OBGYN | Facility: CLINIC | Age: 32
End: 2017-11-09
Payer: COMMERCIAL

## 2017-11-09 VITALS
SYSTOLIC BLOOD PRESSURE: 116 MMHG | HEIGHT: 63 IN | BODY MASS INDEX: 35.86 KG/M2 | DIASTOLIC BLOOD PRESSURE: 70 MMHG | WEIGHT: 202.4 LBS | HEART RATE: 68 BPM

## 2017-11-09 DIAGNOSIS — Z30.430 ENCOUNTER FOR IUD INSERTION: ICD-10-CM

## 2017-11-09 DIAGNOSIS — Z30.430 ENCOUNTER FOR INSERTION OF MIRENA IUD: ICD-10-CM

## 2017-11-09 DIAGNOSIS — Z30.432 ENCOUNTER FOR IUD REMOVAL: Primary | ICD-10-CM

## 2017-11-09 PROCEDURE — 58300 INSERT INTRAUTERINE DEVICE: CPT | Performed by: ADVANCED PRACTICE MIDWIFE

## 2017-11-09 PROCEDURE — 58301 REMOVE INTRAUTERINE DEVICE: CPT | Performed by: ADVANCED PRACTICE MIDWIFE

## 2017-11-09 PROCEDURE — T1013 SIGN LANG/ORAL INTERPRETER: HCPCS | Mod: U3 | Performed by: ADVANCED PRACTICE MIDWIFE

## 2017-11-09 RX ORDER — MULTIPLE VITAMINS W/ MINERALS TAB 9MG-400MCG
1 TAB ORAL DAILY
COMMUNITY

## 2017-11-09 NOTE — MR AVS SNAPSHOT
"              After Visit Summary   11/9/2017    Aubree Laura    MRN: [id number]           Patient Information     Date Of Birth          1985        Visit Information        Provider Department      11/9/2017 9:00 AM Martine Milton Kayla Marie, APRN [facility]        Today's Diagnoses     Encounter for IUD removal    -  1    Encounter for IUD insertion        Encounter for insertion of mirena IUD           Follow-ups after your visit        Follow-up notes from your care team     Return in about 4 weeks (around 12/7/2017).      Your next 10 appointments already scheduled     Dec 07, 2017  9:00 AM CST   SHORT with [facility] ([facility])    [address]  [facility] [address] [phone]              Who to contact     If you have questions or need follow up information about today's clinic visit or your schedule please contact [facility] directly at [phone].  Normal or non-critical lab and imaging results will be communicated to you by RF-iT Solutionshart, letter or phone within 4 business days after the clinic has received the results. If you do not hear from us within 7 days, please contact the clinic through RF-iT Solutionshart or phone. If you have a critical or abnormal lab result, we will notify you by phone as soon as possible.  Submit refill requests through [employer] or call your pharmacy and they will forward the refill request to us. Please allow 3 business days for your refill to be completed.          Additional Information About Your Visit        MyChart Information     [employer] Television lets you send messages to your doctor, view your test results, renew your prescriptions, schedule appointments and more. To sign up, go to [email or web address] . Click on \"Log in\" on the left side of the screen, which will take you to the Welcome page. Then click on \"Sign up Now\" on the right " "side of the page.     You will be asked to enter the access code listed below, as well as some personal information. Please follow the directions to create your username and password.     Your access code is: [email or web address]  Expires: 2018  6:49 AM     Your access code will  in 90 days. If you need help or a new code, please call your [place] clinic or [phone].        Care EveryWhere ID     This is your Care EveryWhere ID. This could be used by other organizations to access your [place] medical records  [id number]        Your Vitals Were     Pulse Height BMI (Body Mass Index)             68 5' 2.5\" (1.588 m) 36.43 kg/m2          Blood Pressure from Last 3 Encounters:   17 116/70   17 94/68   17 106/70    Weight from Last 3 Encounters:   17 202 lb 6.4 oz (91.8 kg)   17 205 lb (93 kg)   17 202 lb 14.4 oz (92 kg)              We Performed the Following     HC LEVONORGESTREL IU 52MG 5 YR     INSERTION INTRAUTERINE DEVICE     REMOVE INTRAUTERINE DEVICE          Today's Medication Changes          These changes are accurate as of: 17 10:00 AM.  If you have any questions, ask your nurse or doctor.               These medicines have changed or have updated prescriptions.        Dose/Directions    * levonorgestrel 20 MCG/24HR IUD   Commonly known as:  MIRENA   This may have changed:  Another medication with the same name was added. Make sure you understand how and when to take each.   Changed by:  Jenn Lay MD        Dose:  1 each   1 each (20 mcg) by Intrauterine route once for 1 dose   Quantity:  1 each   Refills:  0       * levonorgestrel 20 MCG/24HR IUD   Commonly known as:  MIRENA   This may have changed:  You were already taking a medication with the same name, and this prescription was added. Make sure you understand how and when to take each.   Used for:  Encounter for IUD insertion   Changed by:  Natalya Romero APRN CNM        Dose:  1 " each   1 each (20 mcg) by Intrauterine route continuous   Refills:  0       * Notice:  This list has 2 medication(s) that are the same as other medications prescribed for you. Read the directions carefully, and ask your doctor or other care provider to review them with you.         Where to get your medicines      Some of these will need a paper prescription and others can be bought over the counter.  Ask your nurse if you have questions.     You don't need a prescription for these medications     levonorgestrel 20 MCG/24HR IUD                Primary Care Provider Office Phone # Fax #    Kimberly Ayala [phone]       [facility] [address] [facility] [address] [address]        Equal Access to Services     ELLEN GOLDSMITH : Vanessa Read, angle martin, alesia burt, kody marie . So [facility] [phone].    ATENCIÓN: Si habla español, tiene a bennett disposición servicios gratuitos de asistencia lingüística. Llame al [phone].    We comply with applicable federal civil rights laws and [place] laws. We do not discriminate on the basis of race, color, national origin, age, disability, sex, sexual orientation, or gender identity.            Thank you!     Thank you for choosing [facility]  for your care. Our goal is always to provide you with excellent care. Hearing back from our patients is one way we can continue to improve our services. Please take a few minutes to complete the written survey that you may receive in the mail after your visit with us. Thank you!             Your Updated Medication List - Protect others around you: Learn how to safely use, store and throw away your medicines at www.disposemymeds.org.          This list is accurate as of: 11/9/17 10:00 AM.  Always use your most recent med list.                   Brand Name Dispense Instructions for use Diagnosis    * levonorgestrel 20 MCG/24HR IUD    MIRENA     1 each    1 each (20 mcg) by Intrauterine route once for 1 dose        * levonorgestrel 20 MCG/24HR IUD    MIRENA     1 each (20 mcg) by Intrauterine route continuous    Encounter for IUD insertion       Multi-vitamin Tabs tablet      Take 1 tablet by mouth daily        * Notice:  This list has 2 medication(s) that are the same as other medications prescribed for you. Read the directions carefully, and ask your doctor or other care provider to review them with you.

## 2017-11-09 NOTE — NURSING NOTE
"Chief Complaint   Patient presents with     IUD     Remove IUD and replace.       Initial /70 (BP Location: Left arm, Patient Position: Chair, Cuff Size: Adult Regular)  Pulse 68  Ht 5' 2.5\" (1.588 m)  Wt 202 lb 6.4 oz (91.8 kg)  BMI 36.43 kg/m2 Estimated body mass index is 36.43 kg/(m^2) as calculated from the following:    Height as of this encounter: 5' 2.5\" (1.588 m).    Weight as of this encounter: 202 lb 6.4 oz (91.8 kg).  BP completed using cuff size: regular        The following HM Due: NONE      The following patient reported/Care Every where data was sent to:  P ABSTRACT QUALITY INITIATIVES [[id number]]  NA     patient has appointment for today    Negra HOLLAND               "

## 2017-11-09 NOTE — PROGRESS NOTES
INDICATIONS:                                                      Is a pregnancy test required: No.  Was a consent obtained?  Yes    Aubree Laura is a 32 year old female,, No LMP recorded. Patient is not currently having periods (Reason: IUD). Referred from family practice for IUD removal.  XR showed IUD left of midline with slight rotation.  Presents today for IUD removal and insertion of a new IUD. Her current IUD was placed 5 years ago, patient unsure of date but knows it was placed after the birth of her son whom is 5 now. She has had problems including Loss of strings  with the IUD. She requests removal of the IUD because IUD is due to be removed. States she was seen 3 months ago by Dr. Posey who visualized strings.     The risks, benefits and alternatives of IUD insertion were discussed in detail previously. She also has reviewed the product brochure.  She has elected to go ahead with the removal and insertion of the new IUD today and her questions were answered. Consent was signed. Her LMP began on uncertain and was normal in duration and amount of flow. A pregnancy test was performed today:  No      PROCEDURE:                                                      A speculum exam was performed and the cervix was visualized. The IUD string was not visualized. Using packing forceps, Pap broom, and sound, the strings were visualized at the cervical os.  Stingwas grasped with packing forceps and the IUD removed intact.    The pelvic exam revealed normal external genitalia. On bimanual exam the uterus was Midposition and normal in size with no tenderness present. A speculum was inserted into the vagina and the cervix was visualized. The cervix was prepped with Betadine . The anterior lip of the cervix was grasped with a single toothed tenaculum. The uterus sounded to 8 cm. A Mirena IUD was then inserted without difficulty. The string was cut to 4 cm.  The patient experienced a mild amount of  cramping.      POST PROCEDURE:                                                      The patient tolerated the procedure well. Patient was discharged in stable condition.    Call if bleeding, pain or fever occur., Birth control counseling given. and Handouts were given to the patient.   return to clinic for string check 4 weeks.     (Z84.691) Encounter for IUD removal  (primary encounter diagnosis)  Comment: Removed     (Z61.014) Encounter for IUD insertion  Comment: tolerated well  Plan: return to clinic 4 weeks       ISA RhodesM

## 2017-12-07 ENCOUNTER — OFFICE VISIT (OUTPATIENT)
Dept: OBGYN | Facility: CLINIC | Age: 32
End: 2017-12-07
Payer: COMMERCIAL

## 2017-12-07 VITALS
TEMPERATURE: 97.9 F | SYSTOLIC BLOOD PRESSURE: 110 MMHG | BODY MASS INDEX: 36.41 KG/M2 | DIASTOLIC BLOOD PRESSURE: 70 MMHG | WEIGHT: 202.3 LBS

## 2017-12-07 DIAGNOSIS — R39.9 URINARY SYMPTOM OR SIGN: ICD-10-CM

## 2017-12-07 DIAGNOSIS — N39.0 URINARY TRACT INFECTION WITHOUT HEMATURIA, SITE UNSPECIFIED: ICD-10-CM

## 2017-12-07 DIAGNOSIS — N89.8 VAGINAL ITCHING: Primary | ICD-10-CM

## 2017-12-07 LAB
ALBUMIN UR-MCNC: NEGATIVE MG/DL
APPEARANCE UR: ABNORMAL
BACTERIA #/AREA URNS HPF: ABNORMAL /HPF
BILIRUB UR QL STRIP: NEGATIVE
COLOR UR AUTO: YELLOW
GLUCOSE UR STRIP-MCNC: NEGATIVE MG/DL
HGB UR QL STRIP: ABNORMAL
KETONES UR STRIP-MCNC: NEGATIVE MG/DL
LEUKOCYTE ESTERASE UR QL STRIP: ABNORMAL
NITRATE UR QL: NEGATIVE
NON-SQ EPI CELLS #/AREA URNS LPF: ABNORMAL /LPF
PH UR STRIP: 8 PH (ref 5–7)
RBC #/AREA URNS AUTO: ABNORMAL /HPF
SOURCE: ABNORMAL
SP GR UR STRIP: 1.02 (ref 1–1.03)
SPECIMEN SOURCE: NORMAL
UROBILINOGEN UR STRIP-ACNC: 0.2 EU/DL (ref 0.2–1)
WBC #/AREA URNS AUTO: ABNORMAL /HPF
WET PREP SPEC: NORMAL

## 2017-12-07 PROCEDURE — T1013 SIGN LANG/ORAL INTERPRETER: HCPCS | Mod: U3 | Performed by: ADVANCED PRACTICE MIDWIFE

## 2017-12-07 PROCEDURE — 81001 URINALYSIS AUTO W/SCOPE: CPT | Performed by: ADVANCED PRACTICE MIDWIFE

## 2017-12-07 PROCEDURE — 87086 URINE CULTURE/COLONY COUNT: CPT | Performed by: ADVANCED PRACTICE MIDWIFE

## 2017-12-07 PROCEDURE — 99213 OFFICE O/P EST LOW 20 MIN: CPT | Performed by: ADVANCED PRACTICE MIDWIFE

## 2017-12-07 PROCEDURE — 87210 SMEAR WET MOUNT SALINE/INK: CPT | Performed by: ADVANCED PRACTICE MIDWIFE

## 2017-12-07 RX ORDER — NITROFURANTOIN 25; 75 MG/1; MG/1
100 CAPSULE ORAL 2 TIMES DAILY
Qty: 14 CAPSULE | Refills: 0 | Status: SHIPPED | OUTPATIENT
Start: 2017-12-07 | End: 2017-12-14

## 2017-12-07 NOTE — PROGRESS NOTES
SUBJECTIVE:                                                   Aubree Laura is a 32 year old who presents to clinic today for the following health issue(s):  Patient presents with:  RECHECK: on Mirena/ ? misplaced  Vaginal Problem: C/O pain and bleeding with I.C. also C/O vaginal itching  Vaginal Bleeding: Bleeding/spotting X 2 days      HPI: Patient presents today for IUD string check.  States that there was some confusion as to why she was seen in [place] for vaginal itching, vaginal burning, occasional painful intercourse.  On  patient had wet prep, STI testing, and xray for verification of IUD placement.  All testing negative, IUD was then removed and replaced on , patient did not report vaginal symptoms at this time.  Reports today that she has been continuing to have vaginal itching, burning, and painful intercourse.  Had spotting 2 days last week, patient uncertain if this was a period or r/t vaginal symptoms.  She is uncertain if vaginal symptoms r/t urinary burning, states she can't determine if she has urinary burning or if urination irritates vaginal symptoms.  Has not felt for IUD strings.  Reports hx of recurrent UTIs.     No LMP recorded. Patient is not currently having periods (Reason: IUD).  Menstrual History: amenorrhea d/t IUD, spotted 2 days last week   Patient is sexually active  .  Using IUD for contraception.   Health maintenance updated:  yes  STI infx testing offered:  Declined completed      Problem list and histories reviewed & adjusted, as indicated.  Additional history: as documented.    Patient Active Problem List   Diagnosis     CARDIOVASCULAR SCREENING; LDL GOAL LESS THAN 160     Encounter for insertion of mirena IUD     Past Surgical History:   Procedure Laterality Date     C  DELIVERY ONLY       C IUD,MIRENA  3/7/14      SECTION  2012    Procedure:  SECTION;   Repeat  SECTION ;  Surgeon: Kimberly Ayala MD;   Location:  L+D      Social History   Substance Use Topics     Smoking status: Never Smoker     Smokeless tobacco: Never Used     Alcohol use No      Problem (# of Occurrences) Relation (Name,Age of Onset)    DIABETES (3) Mother, Father, Maternal Grandmother    Hyperlipidemia (1) Father            Current Outpatient Prescriptions   Medication Sig     multivitamin, therapeutic with minerals (MULTI-VITAMIN) TABS tablet Take 1 tablet by mouth daily     levonorgestrel (MIRENA) 20 MCG/24HR IUD 1 each (20 mcg) by Intrauterine route continuous     [DISCONTINUED] levonorgestrel (MIRENA) 20 MCG/24HR IUD 1 each (20 mcg) by Intrauterine route once for 1 dose     No current facility-administered medications for this visit.      No Known Allergies    ROS:  C: NEGATIVE for fever, chills, change in weight  I: NEGATIVE for worrisome rashes, moles or lesions  E: NEGATIVE for vision changes or irritation  ENT: NEGATIVE for ear, mouth and throat problems  R: NEGATIVE for significant cough or SOB  B: NEGATIVE for masses, tenderness or discharge  CV: NEGATIVE for chest pain, palpitations or peripheral edema  GI: NEGATIVE for nausea, abdominal pain, heartburn, or change in bowel habits   female: dysuria and vaginal itching/burning   M: NEGATIVE for significant arthralgias or myalgia  N: NEGATIVE for weakness, dizziness or paresthesias  P: NEGATIVE for changes in mood or affect    OBJECTIVE:     /70 (BP Location: Left arm, Patient Position: Chair, Cuff Size: Adult Large)  Temp 97.9  F (36.6  C) (Oral)  Wt 202 lb 4.8 oz (91.8 kg)  BMI 36.41 kg/m2  Body mass index is 36.41 kg/(m^2).    PHYSICAL EXAM:  Constitutional:  Appearance: Well nourished, well developed alert, in no acute distress  Chest:  Respiratory Effort:  Breathing unlabored  Cardiovascular: Heart: Auscultation:  Regular rate, normal rhythm, no murmurs present  Skin:General Inspection:  No rashes present, no lesions present, no areas of discoloration; Genitalia and  Groin:  No rashes present, no lesions present, no areas of discoloration, no masses present.  : No CVA tenderness on percussion   Neurologic/Psychiatric:  Mental Status:  Oriented X3   Pelvic Exam:  External Genitalia:     Normal appearance for age, no tenderness present, no inflammatory lesions present, color normal  Vagina:     Normal vaginal vault without central or paravaginal defects, brown bloody discharge present, no inflammatory lesions present, no masses present  Bladder:     Nontender to palpation  Urethra:   Urethral Body:  Urethra palpation normal, urethra structural support normal   Urethral Meatus:  No erythema or lesions present  Cervix:     Appearance healthy, no lesions present, nontender to palpation,brown blood surrounding cervix present  Uterus:     Uterus: firm, normal sized and nontender, anteverted in position.   Adnexa:     No adnexal tenderness present, no adnexal masses present  Perineum:     Perineum within normal limits, no evidence of trauma, no rashes or skin lesions present  Anus:     Anus within normal limits, no hemorrhoids present  Inguinal Lymph Nodes:     No lymphadenopathy present  Pubic Hair:     Normal pubic hair distribution for age  Genitalia and Groin:     No rashes present, no lesions present, no areas of discoloration, no masses present       In-Clinic Test Results:  No results found for this or any previous visit (from the past 24 hour(s)).    ASSESSMENT/PLAN:                                                      (L29.8) Vaginal itching  (primary encounter diagnosis)  Plan: Wet prep, Urine Microscopic        Wet prep negative   UA, positive Leukocyte, positive bacteria , many bacteria    (R39.9) Urinary symptom or sign  Plan: UA reflex to Microscopic, Urine Culture Aerobic        Bacterial    (N39.0) Urinary tract infection without hematuria, site unspecified  Comment: PRESCRIPTION sent to patient pharmacy   Plan: nitroFURantoin, macrocrystal-monohydrate,          (MACROBID) 100 MG capsule        Will notify patient with  services PRESCRIPTION sent to pharmacy   return to clinic if symptoms do not improve after full treatment course of antibiotics    return to clinic PRN or for annual well woman visit     ISA [place], ESTELA

## 2017-12-07 NOTE — MR AVS SNAPSHOT
"              After Visit Summary   12/7/2017    Aubree Larua    MRN: [id number]           Patient Information     Date Of Birth          1985        Visit Information        Provider Department      12/7/2017 8:45 AM Perri Milton; Natalya Romero APRN [facility]        Today's Diagnoses     Vaginal itching    -  1    Urinary symptom or sign        Urinary tract infection without hematuria, site unspecified           Follow-ups after your visit        Follow-up notes from your care team     Return in about 1 year (around 12/7/2018).      Who to contact     If you have questions or need follow up information about today's clinic visit or your schedule please contact [facility] directly at [phone].  Normal or non-critical lab and imaging results will be communicated to you by MyChart, letter or phone within 4 business days after the clinic has received the results. If you do not hear from us within 7 days, please contact the clinic through MyChart or phone. If you have a critical or abnormal lab result, we will notify you by phone as soon as possible.  Submit refill requests through beqom or call your pharmacy and they will forward the refill request to us. Please allow 3 business days for your refill to be completed.          Additional Information About Your Visit        MyChart Information     beqom lets you send messages to your doctor, view your test results, renew your prescriptions, schedule appointments and more. To sign up, go to [email or web address] . Click on \"Log in\" on the left side of the screen, which will take you to the Welcome page. Then click on \"Sign up Now\" on the right side of the page.     You will be asked to enter the access code listed below, as well as some personal information. Please follow the directions to create your username and password.     Your access code is: [email or web address]  Expires: 1/31/2018  6:49 " AM     Your access code will  in 90 days. If you need help or a new code, please call your [facility] clinic or [phone].        Care EveryWhere ID     This is your Care EveryWhere ID. This could be used by other organizations to access your [facility] medical records  [id number]        Your Vitals Were     Temperature BMI (Body Mass Index)                97.9  F (36.6  C) (Oral) 36.41 kg/m2           Blood Pressure from Last 3 Encounters:   17 110/70   17 116/70   17 94/68    Weight from Last 3 Encounters:   17 202 lb 4.8 oz (91.8 kg)   17 202 lb 6.4 oz (91.8 kg)   17 205 lb (93 kg)              We Performed the Following     UA reflex to Microscopic     Urine Culture Aerobic Bacterial     Urine Microscopic     Wet prep          Today's Medication Changes          These changes are accurate as of: 17  2:36 PM.  If you have any questions, ask your nurse or doctor.               Start taking these medicines.        Dose/Directions    nitroFURantoin (macrocrystal-monohydrate) 100 MG capsule   Commonly known as:  MACROBID   Used for:  Urinary tract infection without hematuria, site unspecified        Dose:  100 mg   Take 1 capsule (100 mg) by mouth 2 times daily for 7 days   Quantity:  14 capsule   Refills:  0         These medicines have changed or have updated prescriptions.        Dose/Directions    levonorgestrel 20 MCG/24HR IUD   Commonly known as:  MIRENA   This may have changed:  Another medication with the same name was removed. Continue taking this medication, and follow the directions you see here.   Used for:  Encounter for IUD insertion        Dose:  1 each   1 each (20 mcg) by Intrauterine route continuous   Refills:  0            Where to get your medicines      These medications were sent to [id number] - [place], [place] - [address]  [address] [facility] [address]    Hours:  9Am-9Pm (M-F) 9Am-6Pm (S&S) Phone:  [phone]      nitroFURantoin (macrocrystal-monohydrate) 100 MG capsule                Primary Care Provider Office Phone # Fax #    Kimberly Ayala [phone]       [facility] [address]  [facility] [address]        Equal Access to Services     [facility] AGUS : Hadii aad ku hadasho Soomaali, waaxda luqadaha, qaybta kaalmada adeegyada, waxay idiin hayaan adechata swartz lanarciso . So [facility] [phone].    ATENCIÓN: Si habla español, tiene a bennett disposición servicios gratuitos de asistencia lingüística. Haja al [phone].    We comply with applicable federal civil rights laws and [place] laws. We do not discriminate on the basis of race, color, national origin, age, disability, sex, sexual orientation, or gender identity.            Thank you!     Thank you for choosing [facility]  for your care. Our goal is always to provide you with excellent care. Hearing back from our patients is one way we can continue to improve our services. Please take a few minutes to complete the written survey that you may receive in the mail after your visit with us. Thank you!             Your Updated Medication List - Protect others around you: Learn how to safely use, store and throw away your medicines at www.disposemymeds.org.          This list is accurate as of: 12/7/17  2:36 PM.  Always use your most recent med list.                   Brand Name Dispense Instructions for use Diagnosis    levonorgestrel 20 MCG/24HR IUD    MIRENA     1 each (20 mcg) by Intrauterine route continuous    Encounter for IUD insertion       Multi-vitamin Tabs tablet      Take 1 tablet by mouth daily        nitroFURantoin (macrocrystal-monohydrate) 100 MG capsule    MACROBID    14 capsule    Take 1 capsule (100 mg) by mouth 2 times daily for 7 days    Urinary tract infection without hematuria, site unspecified

## 2017-12-07 NOTE — NURSING NOTE
"Chief Complaint   Patient presents with     RECHECK     on Mirena/ ? misplaced     Vaginal Problem     C/O pain and bleeding with I.C. also C/O vaginal itching     Vaginal Bleeding     Bleeding/spotting X 2 days       Initial /70 (BP Location: Left arm, Patient Position: Chair, Cuff Size: Adult Large)  Temp 97.9  F (36.6  C) (Oral)  Wt 202 lb 4.8 oz (91.8 kg)  BMI 36.41 kg/m2 Estimated body mass index is 36.41 kg/(m^2) as calculated from the following:    Height as of 17: 5' 2.5\" (1.588 m).    Weight as of this encounter: 202 lb 4.8 oz (91.8 kg).  BP completed using cuff size: large        The following HM Due: NONE      The following patient reported/Care Every where data was sent to:  P ABSTRACT QUALITY INITIATIVES [[id number]]      patient has appointment for today              "

## 2017-12-08 LAB
BACTERIA SPEC CULT: NORMAL
SPECIMEN SOURCE: NORMAL

## 2018-03-12 ENCOUNTER — NURSE TRIAGE (OUTPATIENT)
Dept: NURSING | Facility: CLINIC | Age: 33
End: 2018-03-12

## 2018-03-12 NOTE — TELEPHONE ENCOUNTER
Urine infection started about four days ago and is having painful urination and today is scratching vaginal area.    Aubree denies fever but has noticed blood in urine.

## 2018-03-12 NOTE — TELEPHONE ENCOUNTER
Reason for Disposition    Urinating more frequently than usual (i.e., frequency)    Additional Information    Negative: Shock suspected (e.g., cold/pale/clammy skin, too weak to stand, low BP, rapid pulse)    Negative: Sounds like a life-threatening emergency to the triager    Negative: Followed a genital area injury    Negative: Followed a genital area injury (penis, scrotum)    Negative: Vaginal discharge    Negative: Pus (white, yellow) or bloody discharge from end of penis    Negative: [1] Taking antibiotic for urinary tract infection (UTI) AND [2] female    Negative: [1] Taking antibiotic for urinary tract infection (UTI) AND [2] male    Negative: [1] Discomfort (pain, burning or stinging) when passing urine AND [2] pregnant    Negative: [1] Discomfort (pain, burning or stinging) when passing urine AND [2] postpartum < 1 month    Negative: [1] Discomfort (pain, burning or stinging) when passing urine AND [2] female    Negative: [1] Discomfort (pain, burning or stinging) when passing urine AND [2] male    Negative: Pain or itching in the vulvar area    Negative: Pain in scrotum is main symptom    Negative: Blood in the urine is main symptom    Negative: Symptoms arising from use of a urinary catheter (Segura or Coude)    Negative: [1] Unable to urinate (or only a few drops) > 4 hours AND     [2] bladder feels very full (e.g., palpable bladder or strong urge to urinate)    Negative: [1] Decreased urination and [2] drinking very little AND [2] dehydration suspected (e.g., dark urine, no urine > 12 hours, very dry mouth, very lightheaded)    Negative: Patient sounds very sick or weak to the triager    Negative: Fever > 100.5 F (38.1 C)    Negative: Side (flank) or lower back pain present    Negative: [1] Can't control passage of urine (i.e., urinary incontinence) AND [2] new onset (< 2 weeks) or worsening    Protocols used: URINARY SYMPTOMS-ADULT-

## 2018-03-15 ENCOUNTER — OFFICE VISIT (OUTPATIENT)
Dept: INTERNAL MEDICINE | Facility: CLINIC | Age: 33
End: 2018-03-15
Payer: COMMERCIAL

## 2018-03-15 VITALS
OXYGEN SATURATION: 99 % | HEIGHT: 63 IN | HEART RATE: 75 BPM | DIASTOLIC BLOOD PRESSURE: 74 MMHG | TEMPERATURE: 98.9 F | BODY MASS INDEX: 33.63 KG/M2 | WEIGHT: 189.8 LBS | SYSTOLIC BLOOD PRESSURE: 102 MMHG

## 2018-03-15 DIAGNOSIS — B96.89 BV (BACTERIAL VAGINOSIS): ICD-10-CM

## 2018-03-15 DIAGNOSIS — R82.90 NONSPECIFIC FINDING ON EXAMINATION OF URINE: ICD-10-CM

## 2018-03-15 DIAGNOSIS — N89.8 VAGINAL ITCHING: ICD-10-CM

## 2018-03-15 DIAGNOSIS — R69: ICD-10-CM

## 2018-03-15 DIAGNOSIS — R30.0 DYSURIA: Primary | ICD-10-CM

## 2018-03-15 DIAGNOSIS — N89.8 VAGINAL ODOR: ICD-10-CM

## 2018-03-15 DIAGNOSIS — N76.0 BV (BACTERIAL VAGINOSIS): ICD-10-CM

## 2018-03-15 LAB
ALBUMIN UR-MCNC: 30 MG/DL
APPEARANCE UR: ABNORMAL
BILIRUB UR QL STRIP: NEGATIVE
COLOR UR AUTO: YELLOW
GLUCOSE UR STRIP-MCNC: NEGATIVE MG/DL
HGB UR QL STRIP: ABNORMAL
KETONES UR STRIP-MCNC: NEGATIVE MG/DL
LEUKOCYTE ESTERASE UR QL STRIP: ABNORMAL
NITRATE UR QL: NEGATIVE
PH UR STRIP: 7 PH (ref 5–7)
RBC #/AREA URNS AUTO: ABNORMAL /HPF
SOURCE: ABNORMAL
SP GR UR STRIP: 1.02 (ref 1–1.03)
SPECIMEN SOURCE: ABNORMAL
UROBILINOGEN UR STRIP-ACNC: 0.2 EU/DL (ref 0.2–1)
WBC #/AREA URNS AUTO: >100 /HPF
WET PREP SPEC: ABNORMAL

## 2018-03-15 PROCEDURE — 87086 URINE CULTURE/COLONY COUNT: CPT | Performed by: NURSE PRACTITIONER

## 2018-03-15 PROCEDURE — T1013 SIGN LANG/ORAL INTERPRETER: HCPCS | Mod: U3 | Performed by: NURSE PRACTITIONER

## 2018-03-15 PROCEDURE — 87186 SC STD MICRODIL/AGAR DIL: CPT | Performed by: NURSE PRACTITIONER

## 2018-03-15 PROCEDURE — 87210 SMEAR WET MOUNT SALINE/INK: CPT | Performed by: NURSE PRACTITIONER

## 2018-03-15 PROCEDURE — 87088 URINE BACTERIA CULTURE: CPT | Performed by: NURSE PRACTITIONER

## 2018-03-15 PROCEDURE — 81001 URINALYSIS AUTO W/SCOPE: CPT | Performed by: NURSE PRACTITIONER

## 2018-03-15 PROCEDURE — 99214 OFFICE O/P EST MOD 30 MIN: CPT | Performed by: NURSE PRACTITIONER

## 2018-03-15 RX ORDER — NITROFURANTOIN 25; 75 MG/1; MG/1
CAPSULE ORAL
Qty: 28 CAPSULE | Refills: 0 | Status: SHIPPED | OUTPATIENT
Start: 2018-03-15

## 2018-03-15 RX ORDER — CIPROFLOXACIN 500 MG/1
500 TABLET, FILM COATED ORAL 2 TIMES DAILY
Qty: 14 TABLET | Refills: 0 | Status: SHIPPED | OUTPATIENT
Start: 2018-03-15

## 2018-03-15 RX ORDER — METRONIDAZOLE 500 MG/1
500 TABLET ORAL 2 TIMES DAILY
Qty: 14 TABLET | Refills: 0 | Status: SHIPPED | OUTPATIENT
Start: 2018-03-15

## 2018-03-15 NOTE — PROGRESS NOTES
SUBJECTIVE:   Aubree Laura is a 32 year old female who presents to clinic today for the following health issues: here with spouse and       URINARY TRACT SYMPTOMS      Duration: one week    Description  dysuria, frequency, hematuria, odor, hesitancy, incontinence, back pain, vaginal discharge and burning and itching, had previous x-ray to check for kidney stones  Feels cramps like when she has her period   Odor vaginally     Intensity:  mild    Accompanying signs and symptoms:  Fever/chills: no   Flank pain no   Nausea and vomiting: no   Vaginal symptoms: discharge and dyspareunia (pain in labia/pelvis with intercourse)  Abdominal/Pelvic Pain: YES a little lower pelvic    History  History of frequent UTI's: YES, for 3 yrs, at least once a month  History of kidney stones: no   Sexually Active: YES  Possibility of pregnancy: No, has IUD    Precipitating or alleviating factors: None    Therapies tried and outcome: increase fluid intake and OTC for UTI symptoms   Outcome: no improvement            Problem list and histories reviewed & adjusted, as indicated.  Additional history: as documented    Patient Active Problem List   Diagnosis     CARDIOVASCULAR SCREENING; LDL GOAL LESS THAN 160     Encounter for insertion of mirena IUD     Past Surgical History:   Procedure Laterality Date     C  DELIVERY ONLY       C IUD,MIRENA  3/7/14      SECTION  2012    Procedure:  SECTION;   Repeat  SECTION ;  Surgeon: Kimberly Ayala MD;  Location:  L+D       Social History   Substance Use Topics     Smoking status: Never Smoker     Smokeless tobacco: Never Used     Alcohol use No     Family History   Problem Relation Age of Onset     DIABETES Mother      DIABETES Father      Hyperlipidemia Father      DIABETES Maternal Grandmother            Reviewed and updated as needed this visit by clinical staff  Tobacco  Allergies  Meds  Med Hx  Surg Hx  Fam Hx  Soc Hx     "  Reviewed and updated as needed this visit by Provider         ROS:  Constitutional, HEENT, cardiovascular, pulmonary, gi and gu systems are negative, except as otherwise noted.    OBJECTIVE:     /74 (BP Location: Left arm, Patient Position: Sitting, Cuff Size: Adult Large)  Pulse 75  Temp 98.9  F (37.2  C) (Oral)  Ht 5' 2.5\" (1.588 m)  Wt 189 lb 12.8 oz (86.1 kg)  SpO2 99%  BMI 34.16 kg/m2  Body mass index is 34.16 kg/(m^2).  GENERAL: healthy, alert and no distress  RESP: lungs clear to auscultation - no rales, rhonchi or wheezes  CV: regular rate and rhythm, normal S1 S2, no S3 or S4, no murmur, click or rub, no peripheral edema and peripheral pulses strong  ABDOMEN: soft, nontender, no hepatosplenomegaly, no masses and bowel sounds normal   (female): normal female external genitalia, normal urethral meatus, vaginal mucosa, normal cervix/adnexa/uterus without masses or discharge  PSYCH: mentation appears normal, affect normal/bright    Diagnostic Test Results:  Wet prep clue cells   Color Urine (no units)   Date Value   03/15/2018 Yellow     Appearance Urine (no units)   Date Value   03/15/2018 Cloudy     Glucose Urine (mg/dL)   Date Value   03/15/2018 Negative     Bilirubin Urine (no units)   Date Value   03/15/2018 Negative     Ketones Urine (mg/dL)   Date Value   03/15/2018 Negative     Specific Gravity Urine (no units)   Date Value   03/15/2018 1.020     pH Urine (pH)   Date Value   03/15/2018 7.0     Protein Albumin Urine (mg/dL)   Date Value   03/15/2018 30 (A)     Urobilinogen Urine (EU/dL)   Date Value   03/15/2018 0.2     Nitrite Urine (no units)   Date Value   03/15/2018 Negative     Leukocyte Esterase Urine (no units)   Date Value   03/15/2018 Large (A)         ASSESSMENT/PLAN:             1. Dysuria  Positive ua  - UA reflex to Microscopic and Culture  - nitroFURantoin, macrocrystal-monohydrate, (MACROBID) 100 MG capsule; 1 tab after sex - may repeat in am- so 2 tabs per sexual encounter " as needed  Dispense: 28 capsule; Refill: 0  - ciprofloxacin (CIPRO) 500 MG tablet; Take 1 tablet (500 mg) by mouth 2 times daily  Dispense: 14 tablet; Refill: 0  - Urine Microscopic    2. Vaginal odor  Clue cells   - Wet prep  - metroNIDAZOLE (FLAGYL) 500 MG tablet; Take 1 tablet (500 mg) by mouth 2 times daily  Dispense: 14 tablet; Refill: 0    3. Vaginal itching    - Wet prep    4. Sex related disease  Sex related urinary tract infection symptoms  - nitroFURantoin, macrocrystal-monohydrate, (MACROBID) 100 MG capsule; 1 tab after sex - may repeat in am- so 2 tabs per sexual encounter as needed  Dispense: 28 capsule; Refill: 0    5. Nonspecific finding on examination of urine    - Urine Culture Aerobic Bacterial    6. BV (bacterial vaginosis)    - metroNIDAZOLE (FLAGYL) 500 MG tablet; Take 1 tablet (500 mg) by mouth 2 times daily  Dispense: 14 tablet; Refill: 0    Patient Instructions   We will call you with wet prep results    May take macrobid antibiotic night after sex and morning after sex as needed      Cipro twice daily for 7 days     Increase fluid intake     Consider AZO for discomfort       ISA Rose [facility]    SHAE

## 2018-03-15 NOTE — MR AVS SNAPSHOT
"              After Visit Summary   3/15/2018    Aubree Laura    MRN: [id number]           Patient Information     Date Of Birth          1985        Visit Information        Provider Department      3/15/2018 8:30 AM Martine Milton Lori Marie, APRN CNP [facility]        Today's Diagnoses     Dysuria    -  1    Vaginal odor        Vaginal itching        Sex related disease        Nonspecific finding on examination of urine          Care Instructions    We will call you with wet prep results    May take macrobid antibiotic night after sex and morning after sex as needed      Cipro twice daily for 7 days     Increase fluid intake     Consider AZO for discomfort           Follow-ups after your visit        Who to contact     If you have questions or need follow up information about today's clinic visit or your schedule please contact [facility] directly at [phone].  Normal or non-critical lab and imaging results will be communicated to you by [employer], letter or phone within 4 business days after the clinic has received the results. If you do not hear from us within 7 days, please contact the clinic through [employer] or phone. If you have a critical or abnormal lab result, we will notify you by phone as soon as possible.  Submit refill requests through [employer] or call your pharmacy and they will forward the refill request to us. Please allow 3 business days for your refill to be completed.          Additional Information About Your Visit        MyChart Information     [employer] lets you send messages to your doctor, view your test results, renew your prescriptions, schedule appointments and more. To sign up, go to [email or web address] . Click on \"Log in\" on the left side of the screen, which will take you to the Welcome page. Then click on \"Sign up Now\" on the right side of the page.     You will be asked to enter the access code listed below, as " "well as some personal information. Please follow the directions to create your username and password.     Your access code is: [email or web address]  Expires: 2018  9:33 AM     Your access code will  in 90 days. If you need help or a new code, please call your [facility] clinic or [phone].        Care EveryWhere ID     This is your Care EveryWhere ID. This could be used by other organizations to access your [facility] medical records  [id number]        Your Vitals Were     Pulse Temperature Height Pulse Oximetry BMI (Body Mass Index)       75 98.9  F (37.2  C) (Oral) 5' 2.5\" (1.588 m) 99% 34.16 kg/m2        Blood Pressure from Last 3 Encounters:   03/15/18 102/74   17 110/70   17 116/70    Weight from Last 3 Encounters:   03/15/18 189 lb 12.8 oz (86.1 kg)   17 202 lb 4.8 oz (91.8 kg)   17 202 lb 6.4 oz (91.8 kg)              We Performed the Following     UA reflex to Microscopic and Culture     Urine Culture Aerobic Bacterial     Wet prep          Today's Medication Changes          These changes are accurate as of 3/15/18  9:33 AM.  If you have any questions, ask your nurse or doctor.               Start taking these medicines.        Dose/Directions    ciprofloxacin 500 MG tablet   Commonly known as:  CIPRO   Used for:  Dysuria   Started by:  Sheridan Mcbride APRN CNP        Dose:  500 mg   Take 1 tablet (500 mg) by mouth 2 times daily   Quantity:  14 tablet   Refills:  0       nitroFURantoin (macrocrystal-monohydrate) 100 MG capsule   Commonly known as:  MACROBID   Used for:  Dysuria, Sex related disease   Started by:  Sheridan Mcbride APRN CNP        1 tab after sex - may repeat in am- so 2 tabs per sexual encounter as needed   Quantity:  28 capsule   Refills:  0            Where to get your medicines      These medications were sent to [id number] - [place], [place] - [address]  [address] [facility] [address]    Hours:  9Am-9Pm (M-F) 9Am-6Pm (S&S) Phone: "  [phone]     ciprofloxacin 500 MG tablet    nitroFURantoin (macrocrystal-monohydrate) 100 MG capsule                Primary Care Provider Office Phone # Fax #    Kimberly Ayala [phone]       [facility] [address] [place] [address]        Equal Access to Services     ELLEN AGUS : Hadii aad ku hadasho Soomaali, waaxda luqadaha, qaybta kaalmada adeegyada, waxay idiin hayaan adeeg khfransisco laraman . So [facility] [phone].    ATENCIÓN: Si habla español, tiene a bennett disposición servicios gratuitos de asistencia lingüística. [facility] [phone].    We comply with applicable federal civil rights laws and [place] laws. We do not discriminate on the basis of race, color, national origin, age, disability, sex, sexual orientation, or gender identity.            Thank you!     Thank you for choosing [facility]  for your care. Our goal is always to provide you with excellent care. Hearing back from our patients is one way we can continue to improve our services. Please take a few minutes to complete the written survey that you may receive in the mail after your visit with us. Thank you!             Your Updated Medication List - Protect others around you: Learn how to safely use, store and throw away your medicines at www.disposemymeds.org.          This list is accurate as of 3/15/18  9:33 AM.  Always use your most recent med list.                   Brand Name Dispense Instructions for use Diagnosis    ciprofloxacin 500 MG tablet    CIPRO    14 tablet    Take 1 tablet (500 mg) by mouth 2 times daily    Dysuria       levonorgestrel 20 MCG/24HR IUD    MIRENA     1 each (20 mcg) by Intrauterine route continuous    Encounter for IUD insertion       Multi-vitamin Tabs tablet      Take 1 tablet by mouth daily        nitroFURantoin (macrocrystal-monohydrate) 100 MG capsule    MACROBID    28 capsule    1 tab after sex - may repeat in am- so 2 tabs per sexual encounter as  needed    Dysuria, Sex related disease

## 2018-03-15 NOTE — PATIENT INSTRUCTIONS
We will call you with wet prep results    May take macrobid antibiotic night after sex and morning after sex as needed      Cipro twice daily for 7 days     Increase fluid intake     Consider AZO for discomfort

## 2018-03-15 NOTE — NURSING NOTE
"Chief Complaint   Patient presents with     UTI     one week pain with urination, itching burning, has sx every month (has IUD she was wondering if that may be the cause)       Initial /74 (BP Location: Left arm, Patient Position: Sitting, Cuff Size: Adult Large)  Pulse 75  Temp 98.9  F (37.2  C) (Oral)  Ht 5' 2.5\" (1.588 m)  Wt 189 lb 12.8 oz (86.1 kg)  SpO2 99%  BMI 34.16 kg/m2 Estimated body mass index is 34.16 kg/(m^2) as calculated from the following:    Height as of this encounter: 5' 2.5\" (1.588 m).    Weight as of this encounter: 189 lb 12.8 oz (86.1 kg).  Medication Reconciliation: ruth Honeycutt, JON      "

## 2018-03-17 LAB
BACTERIA SPEC CULT: ABNORMAL
SPECIMEN SOURCE: ABNORMAL

## 2018-10-12 ENCOUNTER — PATIENT OUTREACH (OUTPATIENT)
Dept: CARE COORDINATION | Facility: CLINIC | Age: 33
End: 2018-10-12

## 2018-10-12 NOTE — PROGRESS NOTES
Clinic Care Coordination Contact    Clinic Care Coordination Contact  OUTREACH    Referral Information:  Referral Source: Health Plan         Chief Complaint   Patient presents with     Clinic Care Coordination - Initial     [facility]        Universal Utilization: insurance coverage change      Financial/Insurance: RN CC outreach with [employer] interperter     RN CC outreach in follow up to patient coverage with [facility] coverage program ending 9/30/18. RN CC notified family about gap coverage enrollment in [facility] program through the end of 2018 and shared program contact information if any additional questions.         Patient/Caregiver understanding:          Plan: Patient enrolled in [facility] through 2018.  Patient will need to work directly with [employer] counselor after 2019 for consideration of enrollment in program after 1-1-19.    Juanita Yuen RN  Clinic Care Coordinator  Office [phone]  [email or web address]

## 2019-03-28 NOTE — PROGRESS NOTES
Please send patient a letter to let them know results are normal.    Your labs from recent physical are normal. Please follow diet plan we talked about which includes cutting back on processed foods, reducing intake of rice and tortillas.  Increase intake of fruits and vegetables and exercise at least 30 minutes 5 times a week.   Follow up as needed    Best Wishes    Dr. Jenn Woodard-Bashir  [facility]   Parent

## 2019-10-30 ENCOUNTER — HOSPITAL ENCOUNTER (EMERGENCY)
Facility: CLINIC | Age: 34
Discharge: HOME | End: 2019-10-30
Attending: EMERGENCY MEDICINE | Admitting: EMERGENCY MEDICINE
Payer: OTHER MISCELLANEOUS

## 2019-10-30 VITALS
HEART RATE: 76 BPM | TEMPERATURE: 97.6 F | RESPIRATION RATE: 18 BRPM | OXYGEN SATURATION: 98 % | SYSTOLIC BLOOD PRESSURE: 127 MMHG | DIASTOLIC BLOOD PRESSURE: 71 MMHG

## 2019-10-30 DIAGNOSIS — T23.261A PARTIAL THICKNESS BURN OF BACK OF RIGHT HAND, INITIAL ENCOUNTER: ICD-10-CM

## 2019-10-30 PROCEDURE — 25000125 ZZHC RX 250: Performed by: EMERGENCY MEDICINE

## 2019-10-30 PROCEDURE — 99283 EMERGENCY DEPT VISIT LOW MDM: CPT | Mod: 25

## 2019-10-30 PROCEDURE — 25000128 H RX IP 250 OP 636: Performed by: EMERGENCY MEDICINE

## 2019-10-30 PROCEDURE — 25000132 ZZH RX MED GY IP 250 OP 250 PS 637: Performed by: EMERGENCY MEDICINE

## 2019-10-30 PROCEDURE — 16020 DRESS/DEBRID P-THICK BURN S: CPT

## 2019-10-30 RX ORDER — TRAMADOL HYDROCHLORIDE 50 MG/1
50 TABLET ORAL EVERY 6 HOURS PRN
Qty: 5 TABLET | Refills: 0 | Status: SHIPPED | OUTPATIENT
Start: 2019-10-30 | End: 2019-11-02

## 2019-10-30 RX ORDER — ACETAMINOPHEN 325 MG/1
975 TABLET ORAL ONCE
Status: COMPLETED | OUTPATIENT
Start: 2019-10-30 | End: 2019-10-30

## 2019-10-30 RX ORDER — IBUPROFEN 600 MG/1
600 TABLET, FILM COATED ORAL ONCE
Status: COMPLETED | OUTPATIENT
Start: 2019-10-30 | End: 2019-10-30

## 2019-10-30 RX ADMIN — ACETAMINOPHEN 975 MG: 325 TABLET, FILM COATED ORAL at 11:30

## 2019-10-30 RX ADMIN — IBUPROFEN 600 MG: 600 TABLET, FILM COATED ORAL at 11:30

## 2019-10-30 RX ADMIN — Medication 3 ML: at 11:30

## 2019-10-30 ASSESSMENT — ENCOUNTER SYMPTOMS: WOUND: 1

## 2019-10-30 NOTE — ED AVS SNAPSHOT
[facility] Emergency Department  [address]  [facility] [address]  Phone:  [phone]  Fax:  [phone]                                    Aubree Laura   MRN: [id number]    Department:  [facility] Emergency Department   Date of Visit:  10/30/2019           After Visit Summary Signature Page    I have received my discharge instructions, and my questions have been answered. I have discussed any challenges I see with this plan with the nurse or doctor.    ..........................................................................................................................................  Patient/Patient Representative Signature      ..........................................................................................................................................  Patient Representative Print Name and Relationship to Patient    ..................................................               ................................................  Date                                   Time    ..........................................................................................................................................  Reviewed by Signature/Title    ...................................................              ..............................................  Date                                               Time          22EPIC Rev 08/18

## 2019-10-30 NOTE — LETTER
[facility] EMERGENCY DEPARTMENT  [address]  [facility] [address]  [phone]      2019    Aubree Laura  [address] [facility] [address]  [phone] (home)     : 1985      To Whom it may concern:    Aubree Laura was seen in our Emergency Department today, 2019 for an injury that was reported to be work related.  Please excuse her from work until she is seen for follow up in the burn clinic in the next 2-3 days.     Sincerely,    ________________    Garfield Modi MD

## 2019-10-30 NOTE — ED PROVIDER NOTES
"  History     Chief Complaint:  Burn      HPI   Aubree Laura is a 34 year old female who presents with a burn to her right wrist. Patient was cooking earlier today at work, and she accidentally got hot oil on her right wrist. She denies pain but states that \"it just burns.\"     Allergies:  No known drug allergies.    Medications:    Mirena IUD     Past Medical History:    Anemia  Gestational diabetes    Past Surgical History:      IUD insertion     Family History:    Diabetes   HLD    Social History:  This occurred while at work.     Review of Systems   Skin: Positive for wound.   All other systems reviewed and are negative.      Physical Exam   First Vitals:  BP: 127/71  Pulse: 76  Temp: 97.6  F (36.4  C)  Resp: 18  SpO2: 98 %      Physical Exam  Constitutional: Alert, attentive, GCS 15  HENT:    Nose: Nose normal.    Mouth/Throat: Oropharynx is clear, mucous membranes are moist  Eyes: EOM are normal, anicteric, conjugate gaze  CV: distal extremities warm, well perfused  Chest: Non-labored breathing on RA  MSK: No LE edema, no tenderness to palpation of BLE.  Neurological: Alert, attentive, moving all extremities equally.   Skin: Skin is warm and dry. Partial thickness burn to dorsal aspect of left hand.     Emergency Department Course     Procedures:    Burn Care       WOUND: Partial-thickness burn to the dorsum of right hand, blister ruptured prior to arrival.      LOCATION:  Dorsal aspect of left hand      FUNCTION:  Distally sensation and circulation are intact.      ANESTHESIA:  LET      PREPARATION:  Irrigation with Normal Saline      DEBRIDEMENT:  Sharp debridement to nonviable skin from ruptured blister. Copious washing with warm, soapy water.      CLOSURE:  Wound was closed with dressing after application of bacitracin and Adaptic.       Interventions:  1130: Tylenol, 975 mg, oral   1130: Advil, 600 mg, oral  1130: LET, 3 mL, topical    Emergency Department Course:  The patient arrived in " triage where vitals were measured and recorded.   The patient was then escorted back to the emergency department.   The patient's medical records were reviewed.  Nursing notes and vitals were reviewed.  1105: I performed an exam of the patient as documented above.  The above workup was undertaken.  1200: I performed the wound care; see procedure note above.   Findings and plan explained to the Patient. Patient discharged home, status improved, with instructions regarding supportive care, medications, and reasons to return as well as the importance of close follow-up was reviewed. Patient was prescribed Tramadol.     Impression & Plan      Medical Decision Making:  Aubree Laura is an otherwise healthy 34 year old female presenting for partial thickness burn to her right hand sustained at work. She moved a hot pan and hot grease hit her hand. Tetanus is up to date. Burn is isolated to the dorsum of her right hand and does not involve the palm and is less than 0.5% BSA. Wound was washed with warm, soapy water. Bacitracin and Adaptic was applied after ruptured loose skin was removed with sharp debridement. I recommended close follow up at [facility] burn clinic where she was given the direct number and directions to follow up in 2-3 days. She was given enough dressing for a change. I recommended Tylenol and ibuprofen for pain, elevation and Tramadol for serious pain. Work note given, patient discharged.     Diagnosis:    ICD-10-CM    1. Partial thickness burn of back of right hand, initial encounter T23.261A        Disposition:  Discharged to home.     Discharge Medications:  New Prescriptions    TRAMADOL (ULTRAM) 50 MG TABLET    Take 1 tablet (50 mg) by mouth every 6 hours as needed for severe pain     Garfield Modi MD  [employer]  4:46 PM 10/30/19     Trini LAI am serving as a scribe on 10/30/2019 at 11:05 AM to personally document services performed by Dr. Modi based  on my observations and the provider's statements to me.   [facility] EMERGENCY DEPARTMENT       Garfield Modi MD  10/30/19 [id number]

## 2019-10-30 NOTE — ED TRIAGE NOTES
Patient at work and hot grease on right wrist about a baseball size which blistered and popped. ABC's intact.

## 2019-10-30 NOTE — DISCHARGE INSTRUCTIONS
He should take Tylenol and ibuprofen as needed for your pain.  You should take the tramadol as needed for severe pain.  Should you have unbearable pain, develop fevers, red streaks up your hand you should return the emergency department immediately.  However, you do need to follow-up in the burn clinic, the number is above and you should make an appointment for as soon as possible.  You should change her dressing tomorrow, placing the antibiotic ointment on first, then the jelly soaked gauze and then wrapping it.